# Patient Record
Sex: FEMALE | Race: WHITE | Employment: UNEMPLOYED | URBAN - METROPOLITAN AREA
[De-identification: names, ages, dates, MRNs, and addresses within clinical notes are randomized per-mention and may not be internally consistent; named-entity substitution may affect disease eponyms.]

---

## 2019-06-20 ENCOUNTER — HOSPITAL ENCOUNTER (OUTPATIENT)
Age: 54
Discharge: HOME | End: 2019-06-20
Payer: MEDICARE

## 2019-06-20 ENCOUNTER — APPOINTMENT (OUTPATIENT)
Dept: GENERAL RADIOLOGY | Age: 54
End: 2019-06-20
Payer: MEDICARE

## 2019-06-20 ENCOUNTER — OFFICE VISIT (OUTPATIENT)
Dept: FAMILY MEDICINE CLINIC | Age: 54
End: 2019-06-20
Payer: MEDICARE

## 2019-06-20 ENCOUNTER — HOSPITAL ENCOUNTER (EMERGENCY)
Age: 54
Discharge: HOME | End: 2019-06-20
Attending: EMERGENCY MEDICINE
Payer: MEDICARE

## 2019-06-20 VITALS
HEIGHT: 64 IN | RESPIRATION RATE: 16 BRPM | DIASTOLIC BLOOD PRESSURE: 70 MMHG | HEART RATE: 67 BPM | BODY MASS INDEX: 22.2 KG/M2 | OXYGEN SATURATION: 98 % | TEMPERATURE: 97.8 F | WEIGHT: 130 LBS | SYSTOLIC BLOOD PRESSURE: 117 MMHG

## 2019-06-20 VITALS
OXYGEN SATURATION: 97 % | HEART RATE: 92 BPM | HEIGHT: 64 IN | WEIGHT: 153 LBS | BODY MASS INDEX: 26.12 KG/M2 | DIASTOLIC BLOOD PRESSURE: 100 MMHG | SYSTOLIC BLOOD PRESSURE: 138 MMHG

## 2019-06-20 DIAGNOSIS — Z72.0 TOBACCO ABUSE: ICD-10-CM

## 2019-06-20 DIAGNOSIS — F12.90 MARIJUANA USE: ICD-10-CM

## 2019-06-20 DIAGNOSIS — L73.2 HIDRADENITIS SUPPURATIVA: ICD-10-CM

## 2019-06-20 DIAGNOSIS — E03.9 ACQUIRED HYPOTHYROIDISM: Primary | ICD-10-CM

## 2019-06-20 DIAGNOSIS — S99.921D INJURY OF RIGHT FOOT, SUBSEQUENT ENCOUNTER: ICD-10-CM

## 2019-06-20 DIAGNOSIS — F39 MOOD DISORDER (HCC): ICD-10-CM

## 2019-06-20 DIAGNOSIS — F10.920 ALCOHOLIC INTOXICATION WITHOUT COMPLICATION (HCC): Primary | ICD-10-CM

## 2019-06-20 LAB
A/G RATIO: 1.4 (ref 1.1–2.2)
ACETAMINOPHEN LEVEL: <5 UG/ML (ref 10–30)
ALBUMIN SERPL-MCNC: 5 G/DL (ref 3.4–5)
ALP BLD-CCNC: 56 U/L (ref 40–129)
ALT SERPL-CCNC: 19 U/L (ref 10–40)
AMPHETAMINE SCREEN, URINE: ABNORMAL
ANION GAP SERPL CALCULATED.3IONS-SCNC: 18 MMOL/L (ref 3–16)
AST SERPL-CCNC: 39 U/L (ref 15–37)
BARBITURATE SCREEN URINE: ABNORMAL
BASOPHILS ABSOLUTE: 0.1 K/UL (ref 0–0.2)
BASOPHILS RELATIVE PERCENT: 0.8 %
BENZODIAZEPINE SCREEN, URINE: ABNORMAL
BILIRUB SERPL-MCNC: 0.9 MG/DL (ref 0–1)
BILIRUBIN URINE: NEGATIVE
BLOOD, URINE: ABNORMAL
BUN BLDV-MCNC: 5 MG/DL (ref 7–20)
CALCIUM SERPL-MCNC: 9.6 MG/DL (ref 8.3–10.6)
CANNABINOID SCREEN URINE: POSITIVE
CHLORIDE BLD-SCNC: 97 MMOL/L (ref 99–110)
CLARITY: CLEAR
CO2: 18 MMOL/L (ref 21–32)
COCAINE METABOLITE SCREEN URINE: ABNORMAL
COLOR: YELLOW
CREAT SERPL-MCNC: 0.9 MG/DL (ref 0.6–1.1)
EOSINOPHILS ABSOLUTE: 0.1 K/UL (ref 0–0.6)
EOSINOPHILS RELATIVE PERCENT: 0.9 %
EPITHELIAL CELLS, UA: NORMAL /HPF
ETHANOL: 103 MG/DL (ref 0–0.08)
ETHANOL: 75 MG/DL (ref 0–0.08)
GFR AFRICAN AMERICAN: >60
GFR NON-AFRICAN AMERICAN: >60
GLOBULIN: 3.7 G/DL
GLUCOSE BLD-MCNC: 94 MG/DL (ref 70–99)
GLUCOSE URINE: NEGATIVE MG/DL
HCT VFR BLD CALC: 48.2 % (ref 36–48)
HEMOGLOBIN: 16.5 G/DL (ref 12–16)
KETONES, URINE: NEGATIVE MG/DL
LEUKOCYTE ESTERASE, URINE: NEGATIVE
LITHIUM DOSE AMOUNT: ABNORMAL
LITHIUM LEVEL: <0.1 MMOL/L (ref 0.6–1.2)
LYMPHOCYTES ABSOLUTE: 3.4 K/UL (ref 1–5.1)
LYMPHOCYTES RELATIVE PERCENT: 41.8 %
Lab: ABNORMAL
MCH RBC QN AUTO: 32.3 PG (ref 26–34)
MCHC RBC AUTO-ENTMCNC: 34.3 G/DL (ref 31–36)
MCV RBC AUTO: 94.2 FL (ref 80–100)
METHADONE SCREEN, URINE: ABNORMAL
MICROSCOPIC EXAMINATION: YES
MONOCYTES ABSOLUTE: 0.5 K/UL (ref 0–1.3)
MONOCYTES RELATIVE PERCENT: 5.8 %
NEUTROPHILS ABSOLUTE: 4.1 K/UL (ref 1.7–7.7)
NEUTROPHILS RELATIVE PERCENT: 50.7 %
NITRITE, URINE: NEGATIVE
OPIATE SCREEN URINE: ABNORMAL
OXYCODONE URINE: ABNORMAL
PDW BLD-RTO: 15.1 % (ref 12.4–15.4)
PH UA: 6
PH UA: 6 (ref 5–8)
PHENCYCLIDINE SCREEN URINE: ABNORMAL
PLATELET # BLD: 192 K/UL (ref 135–450)
PMV BLD AUTO: 8.7 FL (ref 5–10.5)
POTASSIUM SERPL-SCNC: 3.5 MMOL/L (ref 3.5–5.1)
PROPOXYPHENE SCREEN: ABNORMAL
PROTEIN UA: NEGATIVE MG/DL
RBC # BLD: 5.11 M/UL (ref 4–5.2)
RBC UA: NORMAL /HPF (ref 0–2)
SALICYLATE, SERUM: <0.3 MG/DL (ref 15–30)
SODIUM BLD-SCNC: 133 MMOL/L (ref 136–145)
SPECIFIC GRAVITY UA: <=1.005 (ref 1–1.03)
T4 FREE: 0.3 NG/DL (ref 0.9–1.8)
TOTAL PROTEIN: 8.7 G/DL (ref 6.4–8.2)
TSH REFLEX: 99.14 UIU/ML (ref 0.27–4.2)
URINE TYPE: ABNORMAL
UROBILINOGEN, URINE: 0.2 E.U./DL
WBC # BLD: 8.1 K/UL (ref 4–11)
WBC UA: NORMAL /HPF (ref 0–5)

## 2019-06-20 PROCEDURE — 84439 ASSAY OF FREE THYROXINE: CPT

## 2019-06-20 PROCEDURE — G0480 DRUG TEST DEF 1-7 CLASSES: HCPCS

## 2019-06-20 PROCEDURE — 73620 X-RAY EXAM OF FOOT: CPT

## 2019-06-20 PROCEDURE — 99285 EMERGENCY DEPT VISIT HI MDM: CPT

## 2019-06-20 PROCEDURE — 80307 DRUG TEST PRSMV CHEM ANLYZR: CPT

## 2019-06-20 PROCEDURE — 85025 COMPLETE CBC W/AUTO DIFF WBC: CPT

## 2019-06-20 PROCEDURE — 4004F PT TOBACCO SCREEN RCVD TLK: CPT | Performed by: NURSE PRACTITIONER

## 2019-06-20 PROCEDURE — G0444 DEPRESSION SCREEN ANNUAL: HCPCS | Performed by: NURSE PRACTITIONER

## 2019-06-20 PROCEDURE — 80053 COMPREHEN METABOLIC PANEL: CPT

## 2019-06-20 PROCEDURE — 3017F COLORECTAL CA SCREEN DOC REV: CPT | Performed by: NURSE PRACTITIONER

## 2019-06-20 PROCEDURE — 80178 ASSAY OF LITHIUM: CPT

## 2019-06-20 PROCEDURE — G8420 CALC BMI NORM PARAMETERS: HCPCS | Performed by: NURSE PRACTITIONER

## 2019-06-20 PROCEDURE — G8427 DOCREV CUR MEDS BY ELIG CLIN: HCPCS | Performed by: NURSE PRACTITIONER

## 2019-06-20 PROCEDURE — 99203 OFFICE O/P NEW LOW 30 MIN: CPT | Performed by: NURSE PRACTITIONER

## 2019-06-20 PROCEDURE — 81001 URINALYSIS AUTO W/SCOPE: CPT

## 2019-06-20 PROCEDURE — 84443 ASSAY THYROID STIM HORMONE: CPT

## 2019-06-20 PROCEDURE — 36415 COLL VENOUS BLD VENIPUNCTURE: CPT

## 2019-06-20 RX ORDER — DOXYCYCLINE HYCLATE 50 MG/1
50 CAPSULE ORAL DAILY
Qty: 30 CAPSULE | Refills: 2 | Status: ON HOLD | OUTPATIENT
Start: 2019-06-20 | End: 2020-06-24 | Stop reason: HOSPADM

## 2019-06-20 RX ORDER — HYDROXYZINE HYDROCHLORIDE 25 MG/1
25 TABLET, FILM COATED ORAL EVERY 8 HOURS PRN
Qty: 90 TABLET | Refills: 0 | Status: SHIPPED | OUTPATIENT
Start: 2019-06-20 | End: 2019-07-17 | Stop reason: SDUPTHER

## 2019-06-20 RX ORDER — QUETIAPINE FUMARATE 100 MG/1
100 TABLET, FILM COATED ORAL NIGHTLY
Qty: 60 TABLET | Refills: 0 | Status: SHIPPED | OUTPATIENT
Start: 2019-06-20 | End: 2019-08-16 | Stop reason: SDUPTHER

## 2019-06-20 RX ORDER — DIVALPROEX SODIUM 125 MG/1
250 CAPSULE, COATED PELLETS ORAL 2 TIMES DAILY
Qty: 120 CAPSULE | Refills: 0 | Status: SHIPPED | OUTPATIENT
Start: 2019-06-20 | End: 2019-07-22 | Stop reason: SDUPTHER

## 2019-06-20 ASSESSMENT — PATIENT HEALTH QUESTIONNAIRE - PHQ9
6. FEELING BAD ABOUT YOURSELF - OR THAT YOU ARE A FAILURE OR HAVE LET YOURSELF OR YOUR FAMILY DOWN: 3
4. FEELING TIRED OR HAVING LITTLE ENERGY: 0
3. TROUBLE FALLING OR STAYING ASLEEP: 3
SUM OF ALL RESPONSES TO PHQ QUESTIONS 1-9: 16
1. LITTLE INTEREST OR PLEASURE IN DOING THINGS: 3
2. FEELING DOWN, DEPRESSED OR HOPELESS: 3
9. THOUGHTS THAT YOU WOULD BE BETTER OFF DEAD, OR OF HURTING YOURSELF: 3
10. IF YOU CHECKED OFF ANY PROBLEMS, HOW DIFFICULT HAVE THESE PROBLEMS MADE IT FOR YOU TO DO YOUR WORK, TAKE CARE OF THINGS AT HOME, OR GET ALONG WITH OTHER PEOPLE: 3
5. POOR APPETITE OR OVEREATING: 0
SUM OF ALL RESPONSES TO PHQ QUESTIONS 1-9: 16
7. TROUBLE CONCENTRATING ON THINGS, SUCH AS READING THE NEWSPAPER OR WATCHING TELEVISION: 0
8. MOVING OR SPEAKING SO SLOWLY THAT OTHER PEOPLE COULD HAVE NOTICED. OR THE OPPOSITE, BEING SO FIGETY OR RESTLESS THAT YOU HAVE BEEN MOVING AROUND A LOT MORE THAN USUAL: 1
SUM OF ALL RESPONSES TO PHQ9 QUESTIONS 1 & 2: 6

## 2019-06-20 ASSESSMENT — ENCOUNTER SYMPTOMS
CHOKING: 0
SHORTNESS OF BREATH: 0
CHEST TIGHTNESS: 0
COUGH: 0
STRIDOR: 0
COLOR CHANGE: 1
WHEEZING: 0
APNEA: 0

## 2019-06-20 NOTE — ED NOTES
MD Khoa Echavarria at bedside speaking with patient at this time.       Mirtha Sandhu RN  06/20/19 7655

## 2019-06-20 NOTE — ED TRIAGE NOTES
Chief Complaint   Patient presents with    Psychiatric Evaluation     Pt brought in by police and placed on a hold for SI. Pt reportedly sent text messages to her  stating she was going to kill herself with a shotgun. Pts  called police. Pt states that she \"drank a little wine\" tonight, but denies drug use. Pt very argumentative with writer and cursing at [facility]. \"Like you fucking care. I guess I am just a [employer] white woman who will get fucked by all of the government mandates. I need to see a . I am a criminal and I have to right to an . \" Pt states \"My  left me for his daddy's jack and now I am just going to kill myself. Fuck it. Just kill me. \"

## 2019-06-20 NOTE — ED NOTES
Discharge instructions reviewed and belongings returned. Pt verbalized her understanding. Pt stated she was cold and asked to wait outside for her . Pt escorted to lobby by LEILA Calderon.       Ana Lilia Sandoval RN  06/20/19 8778

## 2019-06-20 NOTE — ED NOTES
Pt continues to yell at staff. Pt states \"I'm in pain and you don't even fucking care. \" Writer asked patient where she was having pain. Pt states \"on my fucking foot. Right there. \" Pt points to right foot, that is bruised and swollen. Xray order placed. Pt asked to change into safety gown, patient states \"Fine fuck it. \" Pt then takes all of her clothing off and is standing at bedside naked yelling at [facility]! Are you happy now? \" Pt in safety gown at this time and belongings placed at RN station. Pt given warm blankets after yelling at writer that she was \"Fucking cold, but it doesn't matter. I have no rights. Fuck it. Just kill me. \" AliseRN at bedside to draw labs.       Hi Killian RN  06/20/19 9105

## 2019-06-20 NOTE — ED NOTES
Pt arrived ambulatory to [facility] AN [facility]FFIL[facility] OF [facility] with JAQUAN Mckenzie. Pt belongings placed in locker and pt roomed in B3. Pt immediately began crying upon going into the treatment room. Pt then came back out asking when she could leave. JAQUAN banerjee explained to pt that someone would be in to talk with her soon. Pt states, Whitney Pole is soon? You have been telling me that all night and I have been here for 7 hours! \" JAQUAN banerjee explained to pt that it is a long process and that it takes some time. Pt went back into room. No further needs. Will continue to monitor for safety.     ROD Neal
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
## 2019-06-20 NOTE — ED NOTES
Report given to Donnie Garrido in [facility] AN [facility]IL[facility] OF [facility].       Denisa Peguero RN  06/20/19 0298

## 2019-06-20 NOTE — DISCHARGE INSTR - COC
Continuity of Care Form    Patient Name: Eli Jj   :  1965  MRN:  [id number]    Admit date:  2019  Discharge date:  ***    Code Status Order: No Order   Advance Directives:     Admitting Physician:  No admitting provider for patient encounter. PCP: Dian Caraballo MD    Discharging Nurse: [facility] Unit/Room#: B3/B3  Discharging Unit Phone Number: ***    Emergency Contact:   Extended Emergency Contact Information  Primary Emergency Contact: Ashkan Valerio  Address: [address], [place]  Home Phone: [phone]  Work Phone: [phone]  Relation: Spouse  Secondary Emergency Contact: Sebastian Valerio  Home Phone: [phone]  Relation: Parent    Past Surgical History:  Past Surgical History:   Procedure Laterality Date     SECTION      FRACTURE SURGERY      HYSTERECTOMY      LEG SURGERY         Immunization History: There is no immunization history on file for this patient. Active Problems: There is no problem list on file for this patient.       Isolation/Infection:   Isolation          No Isolation            Nurse Assessment:  Last Vital Signs: BP (!) 142/85   Pulse 70   Temp 98.2 °F (36.8 °C) (Oral)   Resp 18   Ht 5' 4\" (1.626 m)   Wt 130 lb (59 kg)   SpO2 100%   BMI 22.31 kg/m²     Last documented pain score (0-10 scale):    Last Weight:   Wt Readings from Last 1 Encounters:   19 130 lb (59 kg)     Mental Status:  {IP PT MENTAL STATUS:67521}    IV Access:  { DEANGELO IV ACCESS:013987871}    Nursing Mobility/ADLs:  Walking   {CHP DME CSP}  Transfer  {CHP DME LWTJ:526435154}  Bathing  {CHP DME UQBT:764451742}  Dressing  {CHP DME WQPE:359125695}  Toileting  {CHP DME KYDS:61964}  Feeding  {CHP DME CBWQ:692835516}  Med Admin  {CHP DME VQWF:914995442}  Med Delivery   { DEANGELO MED Delivery:910563472}    Wound Care Documentation and Therapy:        Elimination:  Continence:   · Bowel: {YES / Treatments After Discharge: ***    Physician Certification: I certify the above information and transfer of Petr Bravo  is necessary for the continuing treatment of the diagnosis listed and that she requires {Admit to Appropriate Level of Care:96384} for {GREATER/LESS:491135191} 30 days.      Update Admission H&P: {CHP DME Changes in PVFA}    PHYSICIAN SIGNATURE:  {Esignature:608361341}
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
## 2019-06-20 NOTE — ED NOTES
Repeat ethanol level and urine sent to lab     Wyatt Domínguez, [address] [place] [address]  06/20/19 2902
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
## 2019-06-20 NOTE — PROGRESS NOTES
2019    Chief Complaint   Patient presents with   Est[facility] Doctor     emotional,boil on LT upper thigh on going,      Marcella Mas (:  1965) is a 46 y.o. female, here for evaluation of the following medical concerns:    New patient here to establish care. She is accompanied by her . She is crying and tearful when I enter the room. States that she has been very emotional.  When asked to further elaborate patient states \" feelings that everybody keeps telling me around. \"  She states that she feels like \"I do not matter anymore. \" She was in the emergency room last evening for threats of hurting herself, patient states that she was \"drunk and I never drink but I needed something to help me. \" She states she would \"never really hurt myself I was just drunk and upset. \" has a history of bipolar disorder, has been off of her medication for the past year. Her previous PCP moved to a different state and patient quit going to that practice because she felt \"no one took me seriously. \" She was on Seroquel, Lithium and Depakote. She states that she will not take lithium anymore because \"I felt like a zombie. \"      History of hidradenitis suppurativa, was previously on doxycycline  50 mg for prevention. States has seen dermatologist \"all my life but diagnosed myself by something that popped up on my computer. \" Currently has boil in the left groin that is increasing in discomfort. Right foot pain: dropped \"something on my foot\", occurred . Had an xray done in the ER that was negative for fracture or dislocation but did not show mild soft tissue swelling. She is not driving due to the pain in the foot. Hypothyroidism: has been off of Synthroid for the past year, reports was on 100 mcg levothyroxine. Review of Systems   Constitutional: Negative for activity change, appetite change, chills, diaphoresis, fatigue, fever and unexpected weight change.    Respiratory: Negative for SURGERY         Social History     Socioeconomic History    Marital status:      Spouse name: Not on file    Number of children: Not on file    Years of education: Not on file    Highest education level: Not on file   Occupational History    Not on file   Social Needs    Financial resource strain: Not on file    Food insecurity:     Worry: Not on file     Inability: Not on file    Transportation needs:     Medical: Not on file     Non-medical: Not on file   Tobacco Use    Smoking status: Current Every Day Smoker     Packs/day: 1.50     Years: 40.00     Pack years: 60.00     Types: Cigarettes    Smokeless tobacco: Never Used    Tobacco comment: handouts   Substance and Sexual Activity    Alcohol use:  Yes     Alcohol/week: 0.6 oz     Types: 1 Glasses of wine per week     Comment: rarely-once every 5 years    Drug use: Yes     Types: Marijuana     Comment: daily    Sexual activity: Yes     Partners: Male   Lifestyle    Physical activity:     Days per week: Not on file     Minutes per session: Not on file    Stress: Not on file   Relationships    Social connections:     Talks on phone: Not on file     Gets together: Not on file     Attends Restoration service: Not on file     Active member of club or organization: Not on file     Attends meetings of clubs or organizations: Not on file     Relationship status: Not on file    Intimate partner violence:     Fear of current or ex partner: Not on file     Emotionally abused: Not on file     Physically abused: Not on file     Forced sexual activity: Not on file   Other Topics Concern    Not on file   Social History Narrative    Not on file        Family History   Problem Relation Age of Onset    Heart Disease Mother     Mental Illness Mother         does not know what she was diagnosed with    Heart Attack Father     Alcohol Abuse Brother     Diabetes Maternal Grandmother      Vitals:    06/20/19 1310   BP: (!) 138/100   Site: Left Upper Arm memory are intact. She does appear somewhat withdrawn. Poor eye contact. Her  is with her and she does seem agitated with him. She is attentive. Nursing note and vitals reviewed. ASSESSMENT/PLAN:  1. Acquired hypothyroidism  - TSH with Reflex; Future    2. Mood disorder (HCC)  - QUEtiapine (SEROQUEL) 100 MG tablet; Take 1 tablet by mouth nightly  Dispense: 60 tablet; Refill: 0  - divalproex (DEPAKOTE SPRINKLES) 125 MG capsule; Take 2 capsules by mouth 2 times daily  Dispense: 120 capsule; Refill: 0  - hydrOXYzine (ATARAX) 25 MG tablet; Take 1 tablet by mouth every 8 hours as needed for Anxiety  Dispense: 90 tablet; Refill: 0  - External Referral to Psychiatry    3. Hidradenitis suppurativa  - doxycycline (VIBRAMYCIN) 50 MG capsule; Take 1 capsule by mouth daily  Dispense: 30 capsule; Refill: 2    4. Injury of right foot, subsequent encounter    5. Marijuana use    6. Tobacco abuse    Resume seroquel and depakote  Start hydroxyzine for anxiety  Referral to psychiatry  Verbal contract with patient today that should any further suicidal thoughts occur she will go to ER. Resume doxycycline  Rest, ice and elevate the right foot  Recommend tobacco and marijuana cessation  Follow up in 4 weeks      No follow-ups on file. An  electronic signature was used to authenticate this note.     --TYLER Fajardo - CNP on 6/21/2019 at 10:02 AM

## 2019-06-21 PROBLEM — L73.2 HIDRADENITIS SUPPURATIVA: Status: ACTIVE | Noted: 2019-06-21

## 2019-06-21 PROBLEM — E03.9 ACQUIRED HYPOTHYROIDISM: Status: ACTIVE | Noted: 2019-06-21

## 2019-06-21 PROBLEM — Z72.0 TOBACCO ABUSE: Status: ACTIVE | Noted: 2019-06-21

## 2019-06-21 PROBLEM — S99.921A INJURY OF RIGHT FOOT: Status: ACTIVE | Noted: 2019-06-21

## 2019-06-21 PROBLEM — F12.90 MARIJUANA USE: Status: ACTIVE | Noted: 2019-06-21

## 2019-06-21 PROBLEM — F39 MOOD DISORDER (HCC): Status: ACTIVE | Noted: 2019-06-21

## 2019-06-21 ASSESSMENT — ENCOUNTER SYMPTOMS: BACK PAIN: 0

## 2019-06-25 RX ORDER — LEVOTHYROXINE SODIUM 0.05 MG/1
50 TABLET ORAL DAILY
Qty: 30 TABLET | Refills: 1 | Status: SHIPPED | OUTPATIENT
Start: 2019-06-25 | End: 2019-09-04 | Stop reason: SDUPTHER

## 2019-07-17 DIAGNOSIS — F39 MOOD DISORDER (HCC): ICD-10-CM

## 2019-07-17 RX ORDER — HYDROXYZINE HYDROCHLORIDE 25 MG/1
25 TABLET, FILM COATED ORAL EVERY 8 HOURS PRN
Qty: 90 TABLET | Refills: 0 | Status: ON HOLD | OUTPATIENT
Start: 2019-07-17 | End: 2020-06-24 | Stop reason: HOSPADM

## 2019-07-22 DIAGNOSIS — F39 MOOD DISORDER (HCC): ICD-10-CM

## 2019-07-23 ENCOUNTER — TELEPHONE (OUTPATIENT)
Dept: FAMILY MEDICINE CLINIC | Age: 54
End: 2019-07-23

## 2019-07-24 RX ORDER — DIVALPROEX SODIUM 125 MG/1
CAPSULE, COATED PELLETS ORAL
Qty: 120 CAPSULE | Refills: 0 | Status: ON HOLD | OUTPATIENT
Start: 2019-07-24 | End: 2020-06-24 | Stop reason: HOSPADM

## 2019-08-16 DIAGNOSIS — F39 MOOD DISORDER (HCC): ICD-10-CM

## 2019-08-16 RX ORDER — QUETIAPINE FUMARATE 100 MG/1
TABLET, FILM COATED ORAL
Qty: 30 TABLET | Refills: 0 | Status: SHIPPED | OUTPATIENT
Start: 2019-08-16 | End: 2019-09-17 | Stop reason: SDUPTHER

## 2019-08-16 NOTE — TELEPHONE ENCOUNTER
We were not able to reach patient last month to tell her to schedule an appointment. A letter was mailed out to her. She no showed her appt on 07/23/19  Last appt - 6/20/2019    No future appointments.

## 2020-06-16 ENCOUNTER — HOSPITAL ENCOUNTER (INPATIENT)
Age: 55
LOS: 7 days | Discharge: HOME | DRG: 885 | End: 2020-06-24
Attending: EMERGENCY MEDICINE | Admitting: PSYCHIATRY & NEUROLOGY
Payer: MEDICARE

## 2020-06-16 ENCOUNTER — APPOINTMENT (OUTPATIENT)
Dept: GENERAL RADIOLOGY | Age: 55
DRG: 885 | End: 2020-06-16
Payer: MEDICARE

## 2020-06-16 LAB
A/G RATIO: 1.3 (ref 1.1–2.2)
ACETAMINOPHEN LEVEL: <5 UG/ML (ref 10–30)
ALBUMIN SERPL-MCNC: 4.5 G/DL (ref 3.4–5)
ALP BLD-CCNC: 47 U/L (ref 40–129)
ALT SERPL-CCNC: 76 U/L (ref 10–40)
AMPHETAMINE SCREEN, URINE: ABNORMAL
ANION GAP SERPL CALCULATED.3IONS-SCNC: 14 MMOL/L (ref 3–16)
AST SERPL-CCNC: 69 U/L (ref 15–37)
BARBITURATE SCREEN URINE: ABNORMAL
BASOPHILS ABSOLUTE: 0.1 K/UL (ref 0–0.2)
BASOPHILS RELATIVE PERCENT: 1.2 %
BENZODIAZEPINE SCREEN, URINE: ABNORMAL
BILIRUB SERPL-MCNC: 0.5 MG/DL (ref 0–1)
BUN BLDV-MCNC: 8 MG/DL (ref 7–20)
CALCIUM SERPL-MCNC: 9.5 MG/DL (ref 8.3–10.6)
CANNABINOID SCREEN URINE: POSITIVE
CHLORIDE BLD-SCNC: 99 MMOL/L (ref 99–110)
CO2: 20 MMOL/L (ref 21–32)
COCAINE METABOLITE SCREEN URINE: ABNORMAL
CREAT SERPL-MCNC: 0.8 MG/DL (ref 0.6–1.1)
EOSINOPHILS ABSOLUTE: 0 K/UL (ref 0–0.6)
EOSINOPHILS RELATIVE PERCENT: 0.1 %
ETHANOL: NORMAL MG/DL (ref 0–0.08)
GFR AFRICAN AMERICAN: >60
GFR NON-AFRICAN AMERICAN: >60
GLOBULIN: 3.6 G/DL
GLUCOSE BLD-MCNC: 115 MG/DL (ref 70–99)
HCG QUALITATIVE: NEGATIVE
HCT VFR BLD CALC: 43.3 % (ref 36–48)
HEMOGLOBIN: 14.6 G/DL (ref 12–16)
LIPASE: 24 U/L (ref 13–60)
LITHIUM DOSE AMOUNT: ABNORMAL
LITHIUM LEVEL: <0.1 MMOL/L (ref 0.6–1.2)
LYMPHOCYTES ABSOLUTE: 1.6 K/UL (ref 1–5.1)
LYMPHOCYTES RELATIVE PERCENT: 21.4 %
Lab: ABNORMAL
MCH RBC QN AUTO: 31.7 PG (ref 26–34)
MCHC RBC AUTO-ENTMCNC: 33.7 G/DL (ref 31–36)
MCV RBC AUTO: 94.1 FL (ref 80–100)
METHADONE SCREEN, URINE: ABNORMAL
MONOCYTES ABSOLUTE: 0.6 K/UL (ref 0–1.3)
MONOCYTES RELATIVE PERCENT: 7.6 %
NEUTROPHILS ABSOLUTE: 5.4 K/UL (ref 1.7–7.7)
NEUTROPHILS RELATIVE PERCENT: 69.7 %
OPIATE SCREEN URINE: ABNORMAL
OXYCODONE URINE: ABNORMAL
PDW BLD-RTO: 13.4 % (ref 12.4–15.4)
PH UA: 6
PHENCYCLIDINE SCREEN URINE: ABNORMAL
PLATELET # BLD: 177 K/UL (ref 135–450)
PMV BLD AUTO: 9 FL (ref 5–10.5)
POTASSIUM REFLEX MAGNESIUM: 3.9 MMOL/L (ref 3.5–5.1)
PROPOXYPHENE SCREEN: ABNORMAL
RBC # BLD: 4.6 M/UL (ref 4–5.2)
SALICYLATE, SERUM: 3.8 MG/DL (ref 15–30)
SODIUM BLD-SCNC: 133 MMOL/L (ref 136–145)
TOTAL PROTEIN: 8.1 G/DL (ref 6.4–8.2)
WBC # BLD: 7.7 K/UL (ref 4–11)

## 2020-06-16 PROCEDURE — 73110 X-RAY EXAM OF WRIST: CPT

## 2020-06-16 PROCEDURE — G0480 DRUG TEST DEF 1-7 CLASSES: HCPCS

## 2020-06-16 PROCEDURE — 99285 EMERGENCY DEPT VISIT HI MDM: CPT

## 2020-06-16 PROCEDURE — 84439 ASSAY OF FREE THYROXINE: CPT

## 2020-06-16 PROCEDURE — 71045 X-RAY EXAM CHEST 1 VIEW: CPT

## 2020-06-16 PROCEDURE — 81001 URINALYSIS AUTO W/SCOPE: CPT

## 2020-06-16 PROCEDURE — 84703 CHORIONIC GONADOTROPIN ASSAY: CPT

## 2020-06-16 PROCEDURE — 84443 ASSAY THYROID STIM HORMONE: CPT

## 2020-06-16 PROCEDURE — 36415 COLL VENOUS BLD VENIPUNCTURE: CPT

## 2020-06-16 PROCEDURE — 80307 DRUG TEST PRSMV CHEM ANLYZR: CPT

## 2020-06-16 PROCEDURE — 80053 COMPREHEN METABOLIC PANEL: CPT

## 2020-06-16 PROCEDURE — 80178 ASSAY OF LITHIUM: CPT

## 2020-06-16 PROCEDURE — 85025 COMPLETE CBC W/AUTO DIFF WBC: CPT

## 2020-06-16 PROCEDURE — 83690 ASSAY OF LIPASE: CPT

## 2020-06-16 PROCEDURE — 73562 X-RAY EXAM OF KNEE 3: CPT

## 2020-06-16 PROCEDURE — 73100 X-RAY EXAM OF WRIST: CPT

## 2020-06-16 RX ORDER — IBUPROFEN 800 MG/1
800 TABLET ORAL ONCE
Status: COMPLETED | OUTPATIENT
Start: 2020-06-17 | End: 2020-06-17

## 2020-06-16 ASSESSMENT — ENCOUNTER SYMPTOMS
VOMITING: 0
CONSTIPATION: 0
NAUSEA: 0
ABDOMINAL PAIN: 0
SORE THROAT: 0
SHORTNESS OF BREATH: 0
COUGH: 0
DIARRHEA: 0

## 2020-06-16 ASSESSMENT — PAIN DESCRIPTION - LOCATION: LOCATION: WRIST

## 2020-06-16 ASSESSMENT — PAIN DESCRIPTION - ORIENTATION: ORIENTATION: LEFT

## 2020-06-16 ASSESSMENT — PAIN SCALES - GENERAL: PAINLEVEL_OUTOF10: 9

## 2020-06-17 PROBLEM — F12.10 CANNABIS USE DISORDER, MILD, ABUSE: Status: ACTIVE | Noted: 2020-06-17

## 2020-06-17 PROBLEM — F31.2 BIPOLAR I DISORDER, CURRENT OR MOST RECENT EPISODE MANIC, WITH PSYCHOTIC FEATURES (HCC): Status: ACTIVE | Noted: 2020-06-17

## 2020-06-17 PROBLEM — F31.9 BIPOLAR 1 DISORDER (HCC): Status: ACTIVE | Noted: 2020-06-17

## 2020-06-17 LAB
BACTERIA: ABNORMAL /HPF
BILIRUBIN URINE: NEGATIVE
BLOOD, URINE: NEGATIVE
CLARITY: CLEAR
COLOR: YELLOW
EPITHELIAL CELLS, UA: ABNORMAL /HPF (ref 0–5)
GLUCOSE URINE: NEGATIVE MG/DL
HYALINE CASTS: ABNORMAL /LPF (ref 0–2)
KETONES, URINE: NEGATIVE MG/DL
LEUKOCYTE ESTERASE, URINE: ABNORMAL
MICROSCOPIC EXAMINATION: YES
MUCUS: ABNORMAL /LPF
NITRITE, URINE: NEGATIVE
PH UA: 6 (ref 5–8)
PROTEIN UA: NEGATIVE MG/DL
RBC UA: ABNORMAL /HPF (ref 0–4)
SARS-COV-2, NAAT: NOT DETECTED
SPECIFIC GRAVITY UA: 1.01 (ref 1–1.03)
T4 FREE: 1.1 NG/DL (ref 0.9–1.8)
TSH SERPL DL<=0.05 MIU/L-ACNC: 12.69 UIU/ML (ref 0.27–4.2)
URINE REFLEX TO CULTURE: ABNORMAL
URINE TYPE: ABNORMAL
UROBILINOGEN, URINE: 0.2 E.U./DL
WBC UA: ABNORMAL /HPF (ref 0–5)

## 2020-06-17 PROCEDURE — 6370000000 HC RX 637 (ALT 250 FOR IP): Performed by: EMERGENCY MEDICINE

## 2020-06-17 PROCEDURE — 99222 1ST HOSP IP/OBS MODERATE 55: CPT | Performed by: PHYSICIAN ASSISTANT

## 2020-06-17 PROCEDURE — 1240000000 HC EMOTIONAL WELLNESS R&B

## 2020-06-17 PROCEDURE — 6370000000 HC RX 637 (ALT 250 FOR IP): Performed by: PSYCHIATRY & NEUROLOGY

## 2020-06-17 PROCEDURE — 99223 1ST HOSP IP/OBS HIGH 75: CPT | Performed by: PSYCHIATRY & NEUROLOGY

## 2020-06-17 PROCEDURE — U0002 COVID-19 LAB TEST NON-CDC: HCPCS

## 2020-06-17 RX ORDER — IBUPROFEN 400 MG/1
400 TABLET ORAL EVERY 6 HOURS PRN
Status: DISCONTINUED | OUTPATIENT
Start: 2020-06-17 | End: 2020-06-17

## 2020-06-17 RX ORDER — POLYETHYLENE GLYCOL 3350 17 G
2 POWDER IN PACKET (EA) ORAL
Status: DISCONTINUED | OUTPATIENT
Start: 2020-06-17 | End: 2020-06-24 | Stop reason: HOSPADM

## 2020-06-17 RX ORDER — OLANZAPINE 10 MG/1
10 INJECTION, POWDER, LYOPHILIZED, FOR SOLUTION INTRAMUSCULAR
Status: DISCONTINUED | OUTPATIENT
Start: 2020-06-17 | End: 2020-06-17

## 2020-06-17 RX ORDER — MAGNESIUM HYDROXIDE/ALUMINUM HYDROXICE/SIMETHICONE 120; 1200; 1200 MG/30ML; MG/30ML; MG/30ML
30 SUSPENSION ORAL EVERY 6 HOURS PRN
Status: DISCONTINUED | OUTPATIENT
Start: 2020-06-17 | End: 2020-06-24 | Stop reason: HOSPADM

## 2020-06-17 RX ORDER — BENZTROPINE MESYLATE 1 MG/ML
2 INJECTION INTRAMUSCULAR; INTRAVENOUS 2 TIMES DAILY PRN
Status: DISCONTINUED | OUTPATIENT
Start: 2020-06-17 | End: 2020-06-24 | Stop reason: HOSPADM

## 2020-06-17 RX ORDER — LITHIUM CARBONATE 300 MG/1
300 TABLET, FILM COATED, EXTENDED RELEASE ORAL EVERY 12 HOURS SCHEDULED
Status: DISCONTINUED | OUTPATIENT
Start: 2020-06-17 | End: 2020-06-23

## 2020-06-17 RX ORDER — ACETAMINOPHEN 325 MG/1
650 TABLET ORAL EVERY 4 HOURS PRN
Status: DISCONTINUED | OUTPATIENT
Start: 2020-06-17 | End: 2020-06-24 | Stop reason: HOSPADM

## 2020-06-17 RX ORDER — LEVOTHYROXINE SODIUM 0.05 MG/1
50 TABLET ORAL DAILY
Status: DISCONTINUED | OUTPATIENT
Start: 2020-06-17 | End: 2020-06-24 | Stop reason: HOSPADM

## 2020-06-17 RX ORDER — DIAZEPAM 5 MG/1
5 TABLET ORAL EVERY 6 HOURS PRN
Status: DISCONTINUED | OUTPATIENT
Start: 2020-06-17 | End: 2020-06-24 | Stop reason: HOSPADM

## 2020-06-17 RX ORDER — OLANZAPINE 10 MG/1
10 TABLET ORAL
Status: DISCONTINUED | OUTPATIENT
Start: 2020-06-17 | End: 2020-06-17

## 2020-06-17 RX ORDER — QUETIAPINE 200 MG/1
200 TABLET, FILM COATED, EXTENDED RELEASE ORAL NIGHTLY
Status: DISCONTINUED | OUTPATIENT
Start: 2020-06-17 | End: 2020-06-24

## 2020-06-17 RX ORDER — IBUPROFEN 800 MG/1
800 TABLET ORAL EVERY 8 HOURS PRN
Status: DISCONTINUED | OUTPATIENT
Start: 2020-06-17 | End: 2020-06-20

## 2020-06-17 RX ORDER — OLANZAPINE 10 MG/1
10 TABLET ORAL NIGHTLY
Status: DISCONTINUED | OUTPATIENT
Start: 2020-06-17 | End: 2020-06-17

## 2020-06-17 RX ORDER — OLANZAPINE 10 MG/1
10 INJECTION, POWDER, LYOPHILIZED, FOR SOLUTION INTRAMUSCULAR 2 TIMES DAILY PRN
Status: DISCONTINUED | OUTPATIENT
Start: 2020-06-17 | End: 2020-06-24 | Stop reason: HOSPADM

## 2020-06-17 RX ORDER — DIAZEPAM 5 MG/1
5 TABLET ORAL ONCE
Status: COMPLETED | OUTPATIENT
Start: 2020-06-17 | End: 2020-06-17

## 2020-06-17 RX ORDER — ACETAMINOPHEN 325 MG/1
650 TABLET ORAL EVERY 4 HOURS PRN
Status: DISCONTINUED | OUTPATIENT
Start: 2020-06-17 | End: 2020-06-17

## 2020-06-17 RX ORDER — NICOTINE 21 MG/24HR
1 PATCH, TRANSDERMAL 24 HOURS TRANSDERMAL DAILY
Status: DISCONTINUED | OUTPATIENT
Start: 2020-06-17 | End: 2020-06-24

## 2020-06-17 RX ORDER — QUETIAPINE FUMARATE 25 MG/1
25 TABLET, FILM COATED ORAL ONCE
Status: COMPLETED | OUTPATIENT
Start: 2020-06-17 | End: 2020-06-17

## 2020-06-17 RX ORDER — TRAZODONE HYDROCHLORIDE 50 MG/1
50 TABLET ORAL NIGHTLY PRN
Status: DISCONTINUED | OUTPATIENT
Start: 2020-06-17 | End: 2020-06-17

## 2020-06-17 RX ORDER — QUETIAPINE FUMARATE 25 MG/1
50 TABLET, FILM COATED ORAL EVERY 4 HOURS PRN
Status: DISCONTINUED | OUTPATIENT
Start: 2020-06-17 | End: 2020-06-24 | Stop reason: HOSPADM

## 2020-06-17 RX ADMIN — ACETAMINOPHEN 650 MG: 325 TABLET ORAL at 20:17

## 2020-06-17 RX ADMIN — LITHIUM CARBONATE 300 MG: 300 TABLET, FILM COATED, EXTENDED RELEASE ORAL at 10:24

## 2020-06-17 RX ADMIN — DIAZEPAM 5 MG: 5 TABLET ORAL at 20:21

## 2020-06-17 RX ADMIN — IBUPROFEN 800 MG: 800 TABLET, FILM COATED ORAL at 14:48

## 2020-06-17 RX ADMIN — QUETIAPINE FUMARATE 25 MG: 25 TABLET ORAL at 01:48

## 2020-06-17 RX ADMIN — OLANZAPINE 10 MG: 10 TABLET, FILM COATED ORAL at 00:47

## 2020-06-17 RX ADMIN — LITHIUM CARBONATE 300 MG: 300 TABLET, FILM COATED, EXTENDED RELEASE ORAL at 20:17

## 2020-06-17 RX ADMIN — LEVOTHYROXINE SODIUM 50 MCG: 50 TABLET ORAL at 10:25

## 2020-06-17 RX ADMIN — QUETIAPINE FUMARATE 200 MG: 200 TABLET, EXTENDED RELEASE ORAL at 20:17

## 2020-06-17 RX ADMIN — DIAZEPAM 5 MG: 5 TABLET ORAL at 10:25

## 2020-06-17 RX ADMIN — QUETIAPINE FUMARATE 50 MG: 25 TABLET ORAL at 14:40

## 2020-06-17 RX ADMIN — IBUPROFEN 800 MG: 800 TABLET, FILM COATED ORAL at 01:48

## 2020-06-17 ASSESSMENT — PAIN SCALES - GENERAL
PAINLEVEL_OUTOF10: 0
PAINLEVEL_OUTOF10: 0
PAINLEVEL_OUTOF10: 9
PAINLEVEL_OUTOF10: 0
PAINLEVEL_OUTOF10: 9
PAINLEVEL_OUTOF10: 0
PAINLEVEL_OUTOF10: 0
PAINLEVEL_OUTOF10: 9
PAINLEVEL_OUTOF10: 9
PAINLEVEL_OUTOF10: 0
PAINLEVEL_OUTOF10: 0
PAINLEVEL_OUTOF10: 9
PAINLEVEL_OUTOF10: 6

## 2020-06-17 ASSESSMENT — SLEEP AND FATIGUE QUESTIONNAIRES
DO YOU USE A SLEEP AID: NO
DO YOU HAVE DIFFICULTY SLEEPING: NO
AVERAGE NUMBER OF SLEEP HOURS: 6

## 2020-06-17 ASSESSMENT — PAIN DESCRIPTION - LOCATION
LOCATION: HEAD
LOCATION: HEAD

## 2020-06-17 ASSESSMENT — PAIN DESCRIPTION - PROGRESSION
CLINICAL_PROGRESSION: NOT CHANGED

## 2020-06-17 ASSESSMENT — PAIN DESCRIPTION - DESCRIPTORS
DESCRIPTORS: ACHING;HEADACHE
DESCRIPTORS: HEADACHE

## 2020-06-17 ASSESSMENT — PAIN DESCRIPTION - PAIN TYPE
TYPE: ACUTE PAIN
TYPE: ACUTE PAIN

## 2020-06-17 ASSESSMENT — PAIN - FUNCTIONAL ASSESSMENT
PAIN_FUNCTIONAL_ASSESSMENT: ACTIVITIES ARE NOT PREVENTED
PAIN_FUNCTIONAL_ASSESSMENT: ACTIVITIES ARE NOT PREVENTED

## 2020-06-17 ASSESSMENT — PAIN DESCRIPTION - FREQUENCY
FREQUENCY: INTERMITTENT
FREQUENCY: INTERMITTENT

## 2020-06-17 ASSESSMENT — PAIN DESCRIPTION - ONSET
ONSET: GRADUAL
ONSET: GRADUAL

## 2020-06-17 ASSESSMENT — LIFESTYLE VARIABLES
HISTORY_ALCOHOL_USE: NO
HISTORY_ALCOHOL_USE: NO

## 2020-06-17 ASSESSMENT — PAIN DESCRIPTION - ORIENTATION
ORIENTATION: MID
ORIENTATION: MID

## 2020-06-17 NOTE — ED NOTES
Resting peacefully in treatment room. No outward signs of distress. Will continue to monitor for pt safety.

## 2020-06-17 NOTE — ED PROVIDER NOTES
Gastrointestinal: Negative for abdominal pain, constipation, diarrhea, nausea and vomiting. Genitourinary: Negative for dysuria. Musculoskeletal: Positive for arthralgias. Skin: Negative for pallor and rash. Neurological: Negative for light-headedness and headaches. All other systems reviewed and are negative. Except as noted above the remainder of the review of systems was reviewed and negative.        PAST MEDICAL HISTORY     Past Medical History:   Diagnosis Date    B[facility] ([facility]     Boils     Seizures ([facility]     Thyroid disease          SURGICAL HISTORY       Past Surgical History:   Procedure Laterality Date     SECTION      FRACTURE SURGERY      HYSTERECTOMY      LEG SURGERY           CURRENT MEDICATIONS       Previous Medications    DIVALPROEX (DEPAKOTE SPRINKLE) 125 MG CAPSULE    TAKE 2 CAPSULES BY MOUTH TWICE A DAY    DOXYCYCLINE (VIBRAMYCIN) 50 MG CAPSULE    Take 1 capsule by mouth daily    HYDROXYZINE (ATARAX) 25 MG TABLET    TAKE 1 TABLET BY MOUTH EVERY 8 HOURS AS NEEDED FOR ANXIETY    LEVOTHYROXINE (SYNTHROID) 50 MCG TABLET    TAKE 1 TABLET BY MOUTH EVERY DAY    QUETIAPINE (SEROQUEL) 100 MG TABLET    TAKE 1 TABLET BY MOUTH EVERY DAY EVERY NIGHT       ALLERGIES     Clonazepam; Haldol [haloperidol lactate]; and Thorazine [chlorpromazine]    FAMILY HISTORY       Family History   Problem Relation Age of Onset    Heart Disease Mother     Mental Illness Mother         does not know what she was diagnosed with    Heart Attack Father     Alcohol Abuse Brother     Diabetes Maternal Grandmother           SOCIAL HISTORY       Social History     Socioeconomic History    Marital status:      Spouse name: Not on file    Number of children: Not on file    Years of education: Not on file    Highest education level: Not on file   Occupational History    Not on file   Social Needs    Financial resource strain: Not on file    Food insecurity     Worry: Not on [address] [facility],  [place], [place]   Phone [phone]   SALICYLATE LEVEL - Abnormal; Notable for the following components:    Salicylate, Serum 3.8 (*)     All other components within normal limits    Narrative:     Performed at:  [facility] [address],  [place], [place]   Phone [phone]   LITHIUM LEVEL - Abnormal; Notable for the following components:    Lithium Lvl <0.1 (*)     All other components within normal limits    Narrative:     Performed at:  [facility] [address],  [place], [place]   Phone [phone]   CBC WITH AUTO DIFFERENTIAL    Narrative:     Performed at:  [facility] [address],  [place]   Phone [phone]   ETHANOL    Narrative:     Performed at:  [facility] [address],  [place]   Phone [phone]   HCG, SERUM, QUALITATIVE    Narrative:     Performed at:  [facility] [address],  [place], [place]   Phone [phone]   LIPASE    Narrative:     Performed at:  [facility] [address],  [place], [place]   Phone [phone]   URINE RT REFLEX TO CULTURE    Narrative:     Performed at:  [facility] [address],  [place], [place]   Phone [phone]   URINE DRUG SCREEN    Narrative:     Performed at:  [facility]) [facility] [address],  [place], [place]   Phone [phone]       All other labs were within normal range or not returned as of this dictation.     EMERGENCY DEPARTMENT COURSE and DIFFERENTIAL DIAGNOSIS/MDM:   Vitals:    Vitals:    06/16/20 2205 06/16/20 2210 06/16/20 2254 06/16/20 2315   BP: 131/85  122/87    Pulse: 89 109 84 82   Resp: 23 27 12 22   Temp: 97.1 °F (36.2 °C)      TempSrc: Oral      SpO2: 96% 97% 97%

## 2020-06-17 NOTE — ED NOTES
Pt resting in room - no signs or symptoms of distress noted - will continue to monitor     Marylu Vora RN  06/17/20 6541
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
## 2020-06-17 NOTE — ED NOTES
Bed: 01  Expected date:   Expected time:   Means of arrival:   Comments:  gabby Evans, [facility]  06/16/20 6869

## 2020-06-17 NOTE — ED NOTES
Pt states she \"fell out of the bed\", pt did not fall from bed, RN was bedside and pt was attempting to climb out of bed.  Pt is stating that her knee is hurting, MD made aware      Mignon Gonzales RN  06/16/20 0057

## 2020-06-17 NOTE — FLOWSHEET NOTE
06/17/20 1456   Activities of Daily Living   Patient Requires assistance with daily self-care activities?    (MARIBETH as pt was recently medicated and unable to engage in assessment at this time )   Leisure Activity 1   [address] as pt was recently medicated and unable to engage in assessment at this time    Frequency   (MARIBETH)   Last time   (MARIBETH)   Barriers to participating    (MARIBETH)   Leisure Activity 2   [address] as pt was recently medicated and unable to engage in assessment at this time    Frequency    (MARIBETH)   Last time    (MARIBETH)   Barriers to participating    (MARIBETH)   Leisure Activity 3   Favorite Leisure Activities  MARIBETH as pt was recently medicated and unable to engage in assessment at this time    Frequency    (MARIBETH)   Last time    (MARIBETH)   Barriers to participating    (MARIBETH)   Social   Patient reports spending the majority of their free time   (MARIBETH)   Patient verbalizes a preference for spending free time   (MARIBETH)   Patients perception of support system   (MARIBETH)   Patients perception of barriers to socializing with others include(s)   (MARIBETH)   Social Details MARIBETH as pt was recently medicated and unable to engage in assessment at this time    Beliefs & Coping   Has difficulty dealing with feelings     (MARIBETH)   Internalizes feelings/Keeps feelings in   (MARIBETH)   Externalizes feelings through aggressiveness or poor temper control    (MARIBETH)   Feels uncomfortable around others    (MARIBETH)   Has difficulty talking to others    ([address])   Depends on others for direction or decisions   (MARIBETH)   Difficulty dealing with anger of others    (MARIBETH)   Difficulty dealing with own anger    (MARIBETH)   Difficulty managing stress   (MARIBETH)   Frequently has difficulty with relationships    (MARIBETH)   Has recently perceived/experienced loss, disappointment, humiliation or failure    (MARIBETH)   General perception about self   (MARIBETH)   Attitude about abilities   (MARIBETH)   Locus of Control    (MARIBETH)   Belief about
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
## 2020-06-17 NOTE — PLAN OF CARE
Pt has been irritable and angry and verbally aggressive to staff. She was medication compliant but demanding. Sitter D/c'd per physician due to patient being safe. Pt denies SI/HI/AVH. She denies incident preceding hospitalization. She was placed on phone restrictions due to multiple calls threatening her family. Will continue to monitor.   Seamus BOYD, RN-BC

## 2020-06-18 LAB
CHOLESTEROL, TOTAL: 205 MG/DL (ref 0–199)
ESTIMATED AVERAGE GLUCOSE: 105.4 MG/DL
HBA1C MFR BLD: 5.3 %
HDLC SERPL-MCNC: 53 MG/DL (ref 40–60)
LDL CHOLESTEROL CALCULATED: 135 MG/DL
TRIGL SERPL-MCNC: 86 MG/DL (ref 0–150)
VLDLC SERPL CALC-MCNC: 17 MG/DL

## 2020-06-18 PROCEDURE — 36415 COLL VENOUS BLD VENIPUNCTURE: CPT

## 2020-06-18 PROCEDURE — 99233 SBSQ HOSP IP/OBS HIGH 50: CPT | Performed by: PSYCHIATRY & NEUROLOGY

## 2020-06-18 PROCEDURE — 80061 LIPID PANEL: CPT

## 2020-06-18 PROCEDURE — 83036 HEMOGLOBIN GLYCOSYLATED A1C: CPT

## 2020-06-18 PROCEDURE — 6370000000 HC RX 637 (ALT 250 FOR IP): Performed by: PSYCHIATRY & NEUROLOGY

## 2020-06-18 PROCEDURE — 1240000000 HC EMOTIONAL WELLNESS R&B

## 2020-06-18 RX ORDER — HYDROCODONE BITARTRATE AND ACETAMINOPHEN 5; 325 MG/1; MG/1
1 TABLET ORAL EVERY 6 HOURS PRN
Status: DISPENSED | OUTPATIENT
Start: 2020-06-18 | End: 2020-06-19

## 2020-06-18 RX ADMIN — HYDROCODONE BITARTRATE AND ACETAMINOPHEN 1 TABLET: 5; 325 TABLET ORAL at 10:16

## 2020-06-18 RX ADMIN — QUETIAPINE FUMARATE 200 MG: 200 TABLET, EXTENDED RELEASE ORAL at 20:16

## 2020-06-18 RX ADMIN — DIAZEPAM 5 MG: 5 TABLET ORAL at 23:09

## 2020-06-18 RX ADMIN — LITHIUM CARBONATE 300 MG: 300 TABLET, FILM COATED, EXTENDED RELEASE ORAL at 20:16

## 2020-06-18 RX ADMIN — QUETIAPINE FUMARATE 50 MG: 25 TABLET ORAL at 23:09

## 2020-06-18 RX ADMIN — DIAZEPAM 5 MG: 5 TABLET ORAL at 10:49

## 2020-06-18 RX ADMIN — HYDROCODONE BITARTRATE AND ACETAMINOPHEN 1 TABLET: 5; 325 TABLET ORAL at 23:09

## 2020-06-18 RX ADMIN — LITHIUM CARBONATE 300 MG: 300 TABLET, FILM COATED, EXTENDED RELEASE ORAL at 09:16

## 2020-06-18 RX ADMIN — IBUPROFEN 800 MG: 800 TABLET, FILM COATED ORAL at 20:16

## 2020-06-18 RX ADMIN — DIAZEPAM 5 MG: 5 TABLET ORAL at 16:58

## 2020-06-18 RX ADMIN — HYDROCODONE BITARTRATE AND ACETAMINOPHEN 1 TABLET: 5; 325 TABLET ORAL at 16:55

## 2020-06-18 RX ADMIN — LEVOTHYROXINE SODIUM 50 MCG: 50 TABLET ORAL at 06:58

## 2020-06-18 ASSESSMENT — PAIN DESCRIPTION - LOCATION
LOCATION: BACK

## 2020-06-18 ASSESSMENT — PAIN DESCRIPTION - FREQUENCY
FREQUENCY: CONTINUOUS
FREQUENCY: CONTINUOUS

## 2020-06-18 ASSESSMENT — PAIN SCALES - GENERAL
PAINLEVEL_OUTOF10: 6
PAINLEVEL_OUTOF10: 8
PAINLEVEL_OUTOF10: 10
PAINLEVEL_OUTOF10: 10
PAINLEVEL_OUTOF10: 4
PAINLEVEL_OUTOF10: 0
PAINLEVEL_OUTOF10: 10
PAINLEVEL_OUTOF10: 10
PAINLEVEL_OUTOF10: 5
PAINLEVEL_OUTOF10: 8
PAINLEVEL_OUTOF10: 0
PAINLEVEL_OUTOF10: 8

## 2020-06-18 ASSESSMENT — PAIN DESCRIPTION - DESCRIPTORS
DESCRIPTORS: ACHING
DESCRIPTORS: ACHING

## 2020-06-18 ASSESSMENT — PAIN DESCRIPTION - PAIN TYPE
TYPE: ACUTE PAIN

## 2020-06-18 ASSESSMENT — PAIN - FUNCTIONAL ASSESSMENT: PAIN_FUNCTIONAL_ASSESSMENT: PREVENTS OR INTERFERES SOME ACTIVE ACTIVITIES AND ADLS

## 2020-06-18 NOTE — BH NOTE
Spoke to patient's daughter who says her animals are being taken care of. Patient still irritable, yelling and screaming at nurse. Patient says everything is being taken away from her, even her freedom.

## 2020-06-18 NOTE — BH NOTE
Patient cussing in room, repeatedly pushing the call light requesting valium and medication for a headache. Adminstered valium and tylenol. Patient requesting light to be turned off. Lights are automatic and system has been down for awhile. Offered patient a cool wash cloth and offered an ice pack. Patient not pleased with any of these options and called this nurse a stupid bitch as I was walking out the door. Will continue to monitor.

## 2020-06-18 NOTE — PROGRESS NOTES
requires  inpatient stabilization. Principal Problem:    Bipolar 1 disorder (HCC)  Active Problems:    Acquired hypothyroidism    Cannabis use disorder, mild, abuse    Dehydration    Arthralgia  Resolved Problems:    * No resolved hospital problems. *     PLAN:  1. Inpatient stabilization for stabilization and treatment. 2.  On admission, started lithium 300 mg twice daily. Discussed at length with the  patient. She understands that her hypothyroidism is most likely related to  lithium, but believes it has been helpful and can tolerate being on  Synthroid. Also start Seroquel 200 mg p.o. q.h.s. The patient tells me   she was on 450 mg nightly. Ordered every 15-minute checks  for safety, programming, and p.r.n. medication for anxiety, agitation,  and insomnia. 3.  Internal Medicine consult for admission. Elevated LFTs   - mild  - no acute symptoms   - Can follow up OP     Dehydration  - Encourage PO intake      Hypothyroidism  - suspect non-compliance with medications (reports she hasnt had meds for ~1 year)  - Resume home synthroid dosing   - TSH >12, Free T4 1.1  - Will need OP follow up and repeat labs with PCP      Arthralgias   - reports she was tackled by the police   - Has bruising to bilateral wrists, right knee  - complains of low back pain  - Refuses exam of extremities/back  - Offered xrays of lower back, patient refuses   - continue PRN pain control, add lidoderm patch     4. Collateral information for care coordination. 5.  The patient will be admitted on a Statement of Belief, placed on a  hold. Hold expires Monday. Estimated length of stay, 5 to 8 days for stabilization. Total time with patient was 35 minutes and more than 50 % of that time was spent counseling the patient on their symptoms, treatment, and expected goals.      Ammy Stone MD, [facility] [place],Third Floor, [place]

## 2020-06-19 ENCOUNTER — APPOINTMENT (OUTPATIENT)
Dept: GENERAL RADIOLOGY | Age: 55
DRG: 885 | End: 2020-06-19
Payer: MEDICARE

## 2020-06-19 PROCEDURE — 6370000000 HC RX 637 (ALT 250 FOR IP): Performed by: NURSE PRACTITIONER

## 2020-06-19 PROCEDURE — 1240000000 HC EMOTIONAL WELLNESS R&B

## 2020-06-19 PROCEDURE — 99233 SBSQ HOSP IP/OBS HIGH 50: CPT | Performed by: PSYCHIATRY & NEUROLOGY

## 2020-06-19 PROCEDURE — 72100 X-RAY EXAM L-S SPINE 2/3 VWS: CPT

## 2020-06-19 PROCEDURE — 6370000000 HC RX 637 (ALT 250 FOR IP): Performed by: PSYCHIATRY & NEUROLOGY

## 2020-06-19 RX ORDER — DIAZEPAM 5 MG/1
5 TABLET ORAL ONCE
Status: COMPLETED | OUTPATIENT
Start: 2020-06-19 | End: 2020-06-19

## 2020-06-19 RX ADMIN — HYDROCODONE BITARTRATE AND ACETAMINOPHEN 1 TABLET: 5; 325 TABLET ORAL at 09:08

## 2020-06-19 RX ADMIN — LEVOTHYROXINE SODIUM 50 MCG: 50 TABLET ORAL at 07:27

## 2020-06-19 RX ADMIN — DIAZEPAM 5 MG: 5 TABLET ORAL at 09:08

## 2020-06-19 RX ADMIN — DIAZEPAM 5 MG: 5 TABLET ORAL at 10:51

## 2020-06-19 RX ADMIN — ACETAMINOPHEN 650 MG: 325 TABLET ORAL at 21:37

## 2020-06-19 RX ADMIN — DIAZEPAM 5 MG: 5 TABLET ORAL at 15:12

## 2020-06-19 RX ADMIN — DIAZEPAM 5 MG: 5 TABLET ORAL at 21:39

## 2020-06-19 RX ADMIN — IBUPROFEN 800 MG: 800 TABLET, FILM COATED ORAL at 15:11

## 2020-06-19 RX ADMIN — QUETIAPINE FUMARATE 200 MG: 200 TABLET, EXTENDED RELEASE ORAL at 21:37

## 2020-06-19 RX ADMIN — LITHIUM CARBONATE 300 MG: 300 TABLET, FILM COATED, EXTENDED RELEASE ORAL at 21:37

## 2020-06-19 RX ADMIN — LITHIUM CARBONATE 300 MG: 300 TABLET, FILM COATED, EXTENDED RELEASE ORAL at 09:03

## 2020-06-19 RX ADMIN — QUETIAPINE FUMARATE 50 MG: 25 TABLET ORAL at 21:37

## 2020-06-19 ASSESSMENT — PAIN DESCRIPTION - PROGRESSION
CLINICAL_PROGRESSION: NOT CHANGED
CLINICAL_PROGRESSION: NOT CHANGED

## 2020-06-19 ASSESSMENT — PAIN DESCRIPTION - FREQUENCY
FREQUENCY: CONTINUOUS
FREQUENCY: CONTINUOUS

## 2020-06-19 ASSESSMENT — PAIN DESCRIPTION - PAIN TYPE
TYPE: ACUTE PAIN
TYPE: ACUTE PAIN

## 2020-06-19 ASSESSMENT — PAIN DESCRIPTION - DESCRIPTORS
DESCRIPTORS: ACHING
DESCRIPTORS: ACHING

## 2020-06-19 ASSESSMENT — PAIN SCALES - GENERAL
PAINLEVEL_OUTOF10: 0
PAINLEVEL_OUTOF10: 0
PAINLEVEL_OUTOF10: 4
PAINLEVEL_OUTOF10: 7
PAINLEVEL_OUTOF10: 0
PAINLEVEL_OUTOF10: 7

## 2020-06-19 ASSESSMENT — PAIN DESCRIPTION - ONSET
ONSET: ON-GOING
ONSET: ON-GOING

## 2020-06-19 ASSESSMENT — PAIN DESCRIPTION - LOCATION
LOCATION: BACK
LOCATION: BACK

## 2020-06-19 NOTE — PLAN OF CARE
Problem: Anger Management/Homicidal Ideation:  Goal: Absence of angry outbursts  Description: Absence of angry outbursts  Outcome: Ongoing  Note: Receiving prn medication to aid in managing increased anxiety/agitation. Reports this \"helps some\". Problem: Pain:  Goal: Pain level will decrease  Description: Pain level will decrease  Outcome: Ongoing  Note: Compliant in obtaining a lumbar back Xray r/t c/o low back pain. Report pain intervention \"It doesn't do much\". Offered ice or heat and declined non-pharmaceutical intervention. Provider aware.

## 2020-06-19 NOTE — PROGRESS NOTES
Department of Psychiatry  Progress Note    Patient's chart was reviewed. Discussed with treatment team. Met with patient. SUBJECTIVE:  Reports that she just wanted her daughter and  to leave her house and they wouldn't. That is why she got the shotgun. Says she never intended to harm anybody, she just wanted them to leave her home. She had not seen her  since the beginning of march and so was surprised and angry when he showed unannounced. Also worries about potentially going to [facility] after this - \"I had plans, everything is ruined, if they hadn't come to my house this would have not happened. \"    Remains elevated, pressured, irritable, and impaired. Limited insight. Tolerating the presumption of medication well and without side effects. Continues with severe pain 2/2 police interaction. Agreed to lumbar xray today.     ROS:   Patient has new complaints: yes - wants to leave  Sleeping adequately:  No:    Appetite adequate: Yes  Engaged in programming: No: isolative    OBJECTIVE:  VITALS:  /65   Pulse 83   Temp 97.6 °F (36.4 °C) (Oral)   Resp 16   Ht 5' 4\" (1.626 m)   Wt 150 lb (68 kg)   SpO2 98%   BMI 25.75 kg/m²     Mental Status Examination:    Appearance: poor grooming and hygiene  Behavior/Attitude toward examiner:  irritable, poor eye contact  Speech: elevated volume   Mood:  \"terrible\"  Affect:  irritable     Thought processes:  more organized  Thought Content: no SI, no HI, less paranoid   Perceptions: not RTIS  Attention: fair  Abstraction: intact  Cognition:  Alert and oriented to person, place, time, and situation, recall intact  Insight: Limited insight   Judgment: Limited judgment     Medication:  Scheduled:   nicotine  1 patch Transdermal Daily    lithium  300 mg Oral 2 times per day    QUEtiapine  200 mg Oral Nightly    levothyroxine  50 mcg Oral Daily        PRN:  benztropine mesylate, magnesium hydroxide, aluminum & magnesium hydroxide-simethicone, nicotine polacrilex, acetaminophen, diazePAM, QUEtiapine, OLANZapine, ibuprofen     FORMULATION:  This is a domiciled, , disabled 59-year-old with  a history of bipolar I disorder, who was brought in by police on a  Statement of Belief to [facility] after a police  standoff in the setting of severe manic symptoms. The patient presents  pressured, irritable, disorganized, and elevated. Given the  significance of yesterday's events and her symptoms, she requires  inpatient stabilization. Principal Problem:    Bipolar 1 disorder (HCC)  Active Problems:    Acquired hypothyroidism    Cannabis use disorder, mild, abuse    Dehydration    Arthralgia  Resolved Problems:    * No resolved hospital problems. *     PLAN:  1. Inpatient stabilization for stabilization and treatment. 2.  On admission, started lithium 300 mg twice daily. Discussed at length with the  patient. She understands that her hypothyroidism is most likely related to  lithium, but believes it has been helpful and can tolerate being on  Synthroid. Also start Seroquel 200 mg p.o. q.h.s. The patient tells me   she was on 450 mg nightly. Ordered every 15-minute checks  for safety, programming, and p.r.n. medication for anxiety, agitation,  and insomnia. 6/19/2020 - check lithium level Monday AM. Order placed. 3.  Internal Medicine consult for admission. Elevated LFTs   - mild  - no acute symptoms   - Can follow up OP     Dehydration  - Encourage PO intake      Hypothyroidism  - suspect non-compliance with medications (reports she hasnt had meds for ~1 year)  - Resume home synthroid dosing   - TSH >12, Free T4 1.1  - Will need OP follow up and repeat labs with PCP      Arthralgias   - reports she was tackled by the police   - Has bruising to bilateral wrists, right knee  - complains of low back pain  - Refuses exam of extremities/back  - agreed to  xrays of lower back  - continue PRN pain control, add lidoderm patch     4. Collateral information for care coordination. 5.  The patient will be admitted on a Statement of Belief, placed on a  hold. Hold expires Monday. Estimated length of stay, 5 to 8 days for stabilization. Total time with patient was 35 minutes and more than 50 % of that time was spent counseling the patient on their symptoms, treatment, and expected goals.      Lydia Oneil MD, [address] [place] [address],Third Floor, [facility]

## 2020-06-19 NOTE — BH NOTE
Abrupt and blunt during interaction. Cussing at staff members. Demanding \"I've been asking for pain medication and something for my nerves\". Received Narco 5/325 mg Po for management of back pain also received Valium 5 mg PO for management of anxiety. Morning meal warmed up and delivered to her in assigned room.

## 2020-06-19 NOTE — BH NOTE
Received onne time dose of Valium 5 mg PO to aid in decreasing anxiety. Informed of new order to receive an Xray of lumbar back.

## 2020-06-20 ENCOUNTER — APPOINTMENT (OUTPATIENT)
Dept: GENERAL RADIOLOGY | Age: 55
DRG: 885 | End: 2020-06-20
Payer: MEDICARE

## 2020-06-20 ENCOUNTER — APPOINTMENT (OUTPATIENT)
Dept: CT IMAGING | Age: 55
DRG: 885 | End: 2020-06-20
Payer: MEDICARE

## 2020-06-20 PROCEDURE — 99233 SBSQ HOSP IP/OBS HIGH 50: CPT | Performed by: PSYCHIATRY & NEUROLOGY

## 2020-06-20 PROCEDURE — 1240000000 HC EMOTIONAL WELLNESS R&B

## 2020-06-20 PROCEDURE — 2500000003 HC RX 250 WO HCPCS: Performed by: PSYCHIATRY & NEUROLOGY

## 2020-06-20 PROCEDURE — 6370000000 HC RX 637 (ALT 250 FOR IP): Performed by: NURSE PRACTITIONER

## 2020-06-20 PROCEDURE — 73502 X-RAY EXAM HIP UNI 2-3 VIEWS: CPT

## 2020-06-20 PROCEDURE — 6370000000 HC RX 637 (ALT 250 FOR IP): Performed by: PSYCHIATRY & NEUROLOGY

## 2020-06-20 PROCEDURE — 99232 SBSQ HOSP IP/OBS MODERATE 35: CPT | Performed by: PHYSICIAN ASSISTANT

## 2020-06-20 PROCEDURE — 6370000000 HC RX 637 (ALT 250 FOR IP): Performed by: PHYSICIAN ASSISTANT

## 2020-06-20 PROCEDURE — 73562 X-RAY EXAM OF KNEE 3: CPT

## 2020-06-20 PROCEDURE — 2580000003 HC RX 258

## 2020-06-20 PROCEDURE — 6360000002 HC RX W HCPCS: Performed by: PHYSICIAN ASSISTANT

## 2020-06-20 PROCEDURE — 70450 CT HEAD/BRAIN W/O DYE: CPT

## 2020-06-20 RX ORDER — KETOROLAC TROMETHAMINE 30 MG/ML
30 INJECTION, SOLUTION INTRAMUSCULAR; INTRAVENOUS EVERY 6 HOURS PRN
Status: DISCONTINUED | OUTPATIENT
Start: 2020-06-20 | End: 2020-06-20

## 2020-06-20 RX ORDER — KETOROLAC TROMETHAMINE 30 MG/ML
30 INJECTION, SOLUTION INTRAMUSCULAR; INTRAVENOUS EVERY 6 HOURS
Status: DISCONTINUED | OUTPATIENT
Start: 2020-06-20 | End: 2020-06-20 | Stop reason: DRUGHIGH

## 2020-06-20 RX ORDER — KETOROLAC TROMETHAMINE 30 MG/ML
30 INJECTION, SOLUTION INTRAMUSCULAR; INTRAVENOUS EVERY 6 HOURS PRN
Status: DISCONTINUED | OUTPATIENT
Start: 2020-06-20 | End: 2020-06-22

## 2020-06-20 RX ORDER — PREDNISONE 20 MG/1
40 TABLET ORAL DAILY
Status: COMPLETED | OUTPATIENT
Start: 2020-06-20 | End: 2020-06-22

## 2020-06-20 RX ORDER — LIDOCAINE 4 G/G
1 PATCH TOPICAL DAILY
Status: DISCONTINUED | OUTPATIENT
Start: 2020-06-20 | End: 2020-06-24 | Stop reason: HOSPADM

## 2020-06-20 RX ORDER — CYCLOBENZAPRINE HCL 10 MG
10 TABLET ORAL 3 TIMES DAILY PRN
Status: DISPENSED | OUTPATIENT
Start: 2020-06-20 | End: 2020-06-22

## 2020-06-20 RX ADMIN — LITHIUM CARBONATE 300 MG: 300 TABLET, FILM COATED, EXTENDED RELEASE ORAL at 12:52

## 2020-06-20 RX ADMIN — QUETIAPINE FUMARATE 50 MG: 25 TABLET ORAL at 22:57

## 2020-06-20 RX ADMIN — QUETIAPINE FUMARATE 200 MG: 200 TABLET, EXTENDED RELEASE ORAL at 21:19

## 2020-06-20 RX ADMIN — LEVOTHYROXINE SODIUM 50 MCG: 50 TABLET ORAL at 07:25

## 2020-06-20 RX ADMIN — OLANZAPINE 10 MG: 10 INJECTION, POWDER, FOR SOLUTION INTRAMUSCULAR at 14:41

## 2020-06-20 RX ADMIN — IBUPROFEN 800 MG: 800 TABLET, FILM COATED ORAL at 12:55

## 2020-06-20 RX ADMIN — PREDNISONE 40 MG: 20 TABLET ORAL at 13:48

## 2020-06-20 RX ADMIN — ACETAMINOPHEN 650 MG: 325 TABLET ORAL at 21:33

## 2020-06-20 RX ADMIN — DIAZEPAM 5 MG: 5 TABLET ORAL at 22:51

## 2020-06-20 RX ADMIN — DIAZEPAM 5 MG: 5 TABLET ORAL at 12:55

## 2020-06-20 RX ADMIN — LITHIUM CARBONATE 300 MG: 300 TABLET, FILM COATED, EXTENDED RELEASE ORAL at 21:19

## 2020-06-20 RX ADMIN — WATER: 1 INJECTION INTRAMUSCULAR; INTRAVENOUS; SUBCUTANEOUS at 15:45

## 2020-06-20 RX ADMIN — CYCLOBENZAPRINE HYDROCHLORIDE 10 MG: 10 TABLET, FILM COATED ORAL at 20:10

## 2020-06-20 RX ADMIN — KETOROLAC TROMETHAMINE 30 MG: 30 INJECTION, SOLUTION INTRAMUSCULAR at 19:01

## 2020-06-20 ASSESSMENT — PAIN DESCRIPTION - PROGRESSION
CLINICAL_PROGRESSION: NOT CHANGED
CLINICAL_PROGRESSION: GRADUALLY WORSENING
CLINICAL_PROGRESSION: NOT CHANGED

## 2020-06-20 ASSESSMENT — PAIN DESCRIPTION - LOCATION: LOCATION: BACK;HIP;KNEE

## 2020-06-20 ASSESSMENT — PAIN DESCRIPTION - PAIN TYPE: TYPE: CHRONIC PAIN

## 2020-06-20 ASSESSMENT — PAIN SCALES - GENERAL
PAINLEVEL_OUTOF10: 7
PAINLEVEL_OUTOF10: 10

## 2020-06-20 ASSESSMENT — PAIN DESCRIPTION - FREQUENCY: FREQUENCY: CONTINUOUS

## 2020-06-20 ASSESSMENT — PAIN - FUNCTIONAL ASSESSMENT: PAIN_FUNCTIONAL_ASSESSMENT: PREVENTS OR INTERFERES WITH MANY ACTIVE NOT PASSIVE ACTIVITIES

## 2020-06-20 ASSESSMENT — PAIN DESCRIPTION - DESCRIPTORS: DESCRIPTORS: DULL;THROBBING

## 2020-06-20 ASSESSMENT — PAIN DESCRIPTION - ONSET: ONSET: ON-GOING

## 2020-06-20 NOTE — BH NOTE
[facility]  Day 3 Interdisciplinary Treatment Plan NOTE    Review Date & Time: 6/20/20 5142    Patient was not in treatment team    Admission Type:   Admission Type: Involuntary    Reason for admission:  Reason for Admission: pt threatened to kill daughter and also commit suicide  Estimated Length of Stay Update:  1 to 2 days  Estimated Discharge Date Update: 6/21/20 to 6/22/20    PATIENT STRENGTHS:  Patient Strengths Strengths: No significant Physical Illness  Patient Strengths and Limitations:Limitations: (MARIBETH)  Addictive Behavior:Addictive Behavior  In the past 3 months, have you felt or has someone told you that you have a problem with:  : (maribeth)  Do you have a history of Chemical Use?: No  Do you have a history of Alcohol Use?: No  Do you have a history of Street Drug Abuse?: Yes  Histroy of Prescripton Drug Abuse?: Yes  Medical Problems:  Past Medical History:   Diagnosis Date    Bipolar affective ([facility] [address]     Boils     Seizures ([facility] [address] [facility]     Thyroid disease        Risk:  Fall RiskTotal: 55  Timothy Scale Timothy Scale Score: 22  BVC Total: 5  Change in scores none.  Changes to plan of Care none    Status EXAM:   Status and Exam  Normal: No  Facial Expression: Exaggerated, Elevated, Hostile  Affect: Inappropriate, Unstable  Level of Consciousness: Alert  Mood:Normal: No  Mood: Anxious, Labile, Angry, Helpless, Irritable  Motor Activity:Normal: No  Motor Activity: Decreased  Interview Behavior: Impulsive, Irritable, Uncooperative/Withdrawn, Evasive  Preception: [place] to Person, [place] to Time, [place] to Place, [place] to Situation  Attention:Normal: No  Attention: Distractible, Unable to Concentrate  Thought Processes: Perseveration  Thought Content:Normal: No  Thought Content: Paranoia, Preoccupations  Hallucinations: None, Other (Comment)(patient denied)  Delusions: Yes  Delusions: Persecution, Obsessions  Memory:Normal: Yes  Memory: Poor Recent, Poor Remote  Insight and Judgment: No  Insight and

## 2020-06-20 NOTE — PROGRESS NOTES
Patient received tylenol 650 mg po for c/o back pain that she rated at a 4. Patient was medicated with valium 5 mg po for anxiety related to acting out behaviors earlier in the shift, and inability to control her emotions. seroquel 50 mg po was given to patient for agitation.

## 2020-06-20 NOTE — BH NOTE
Medical PA Faith Finley notified of unwitnessed fall and patient complaining of left head pain and right knee pain. CT ordered of head. No xray of knee ordered per Lenn Hammans due to recent injury and x ray of knee WNL. No neuro checks ordered by Lenn Hammans either. Dr. Modena Closs notified and Director of [facility] notified. Will continue to monitor.

## 2020-06-20 NOTE — BH NOTE
At [address] transport arrived to take [facility] for CT and xray. Writer and transport at bedside to assist patient to wheelchair and take off unit. Writer asked patient if she needed assistance pivoting from bed to chair and she stated no. When she pivoted, she went to sit too quickly and missed the chair and landed flat on her buttocks with medium force. She immediately cursed at transport calling her \"tubby\" and that she \"should have scooted the chair closer and that she was going to be sued. \"  Chair was locked and close to bedside to provide safety and patient did look at chair before she sat down. Writer summoned small side nurse Saranya Purdy [id number] and we provided a 2 person assist off of ground and into chair with no acute complaint. [facility] was then taken to CT and Xray where she remained irritable and impatient. Upon return to unit, she was given her PRN toradol IM to right buttock for continued pain to back and hip. Patient was offered flexaril prior to Xray but elected to wait until she was back from xray to get injection instead.

## 2020-06-20 NOTE — BH NOTE
Zyprexa given IM to right deltoid for extreme agitation as evidence by yelling at top of lungs, cursing at staff, demanding additional pain medications per recommendation of Dr. Brandi Coreas. She also ordered for patient to keep her door open. BP 11/56, P 104. Patient was also moved to small side of unit due to extreme disruptive behavior.

## 2020-06-20 NOTE — PROGRESS NOTES
Brief Progress Note    Date: 06/20/20    Subjective: reported falling when she was getting out of bed 2/2 difficulty with ambulation 2/2 pain. Pt reports she was tackled by police and complains of right hip pain with sciatic nerve pain. Denies any nausea, vomiting, LOC, vision changes. Objective:  Vitals:    06/20/20 0837   BP: 117/63   Pulse: 75   Resp: 16   Temp: 97.7 °F (36.5 °C)   SpO2: 95%       Physical Exam:  General: Middle aged  female, irritable  Resp: CTA  Cardio: RRR  MSK: pt uncooperative w/ exam, refused to ambulate, roll over or allow this provider to examine her 2/2 pain; she does report she is able to move but does not want to 2/2 pain  Neuro: Non-focal on exam, CN II-XII intact  Skin: scattered areas of healing ecchymosis noted on UE bilaterally    Assessment:  1. Right Hip Pain with Sciatica  2. Unwitnessed Fall  3. Left Forehead Pain    Plan:    1. Check CT head, XR right hip  2. Fall precautions  3.  Stop Ibuprofen, start [facility] IM Toradol, Lidocaine patch, short burst of Prednisone and PRN muscle relaxer    Laine Steele PA-C 2:34 PM 6/20/2020

## 2020-06-21 PROCEDURE — 2580000003 HC RX 258

## 2020-06-21 PROCEDURE — 6360000002 HC RX W HCPCS: Performed by: PHYSICIAN ASSISTANT

## 2020-06-21 PROCEDURE — 2500000003 HC RX 250 WO HCPCS: Performed by: PSYCHIATRY & NEUROLOGY

## 2020-06-21 PROCEDURE — 6370000000 HC RX 637 (ALT 250 FOR IP): Performed by: PSYCHIATRY & NEUROLOGY

## 2020-06-21 PROCEDURE — 6370000000 HC RX 637 (ALT 250 FOR IP): Performed by: PHYSICIAN ASSISTANT

## 2020-06-21 PROCEDURE — 1240000000 HC EMOTIONAL WELLNESS R&B

## 2020-06-21 RX ADMIN — LITHIUM CARBONATE 300 MG: 300 TABLET, FILM COATED, EXTENDED RELEASE ORAL at 09:11

## 2020-06-21 RX ADMIN — KETOROLAC TROMETHAMINE 30 MG: 30 INJECTION, SOLUTION INTRAMUSCULAR at 01:59

## 2020-06-21 RX ADMIN — KETOROLAC TROMETHAMINE 30 MG: 30 INJECTION, SOLUTION INTRAMUSCULAR at 16:15

## 2020-06-21 RX ADMIN — OLANZAPINE 10 MG: 10 INJECTION, POWDER, FOR SOLUTION INTRAMUSCULAR at 12:47

## 2020-06-21 RX ADMIN — QUETIAPINE FUMARATE 50 MG: 25 TABLET ORAL at 16:56

## 2020-06-21 RX ADMIN — DIAZEPAM 5 MG: 5 TABLET ORAL at 23:10

## 2020-06-21 RX ADMIN — WATER 2.1 ML: 1 INJECTION INTRAMUSCULAR; INTRAVENOUS; SUBCUTANEOUS at 14:58

## 2020-06-21 RX ADMIN — KETOROLAC TROMETHAMINE 30 MG: 30 INJECTION, SOLUTION INTRAMUSCULAR at 23:10

## 2020-06-21 RX ADMIN — DIAZEPAM 5 MG: 5 TABLET ORAL at 09:27

## 2020-06-21 RX ADMIN — QUETIAPINE FUMARATE 200 MG: 200 TABLET, EXTENDED RELEASE ORAL at 20:54

## 2020-06-21 RX ADMIN — CYCLOBENZAPRINE HYDROCHLORIDE 10 MG: 10 TABLET, FILM COATED ORAL at 21:14

## 2020-06-21 RX ADMIN — LITHIUM CARBONATE 300 MG: 300 TABLET, FILM COATED, EXTENDED RELEASE ORAL at 20:54

## 2020-06-21 RX ADMIN — CYCLOBENZAPRINE HYDROCHLORIDE 10 MG: 10 TABLET, FILM COATED ORAL at 16:15

## 2020-06-21 RX ADMIN — KETOROLAC TROMETHAMINE 30 MG: 30 INJECTION, SOLUTION INTRAMUSCULAR at 09:12

## 2020-06-21 RX ADMIN — PREDNISONE 40 MG: 20 TABLET ORAL at 09:11

## 2020-06-21 RX ADMIN — DIAZEPAM 5 MG: 5 TABLET ORAL at 16:56

## 2020-06-21 RX ADMIN — LEVOTHYROXINE SODIUM 50 MCG: 50 TABLET ORAL at 09:25

## 2020-06-21 RX ADMIN — ACETAMINOPHEN 650 MG: 325 TABLET ORAL at 18:17

## 2020-06-21 ASSESSMENT — PAIN DESCRIPTION - FREQUENCY
FREQUENCY: CONTINUOUS

## 2020-06-21 ASSESSMENT — PAIN SCALES - GENERAL
PAINLEVEL_OUTOF10: 8
PAINLEVEL_OUTOF10: 7
PAINLEVEL_OUTOF10: 6
PAINLEVEL_OUTOF10: 8
PAINLEVEL_OUTOF10: 7
PAINLEVEL_OUTOF10: 8

## 2020-06-21 ASSESSMENT — PAIN DESCRIPTION - LOCATION
LOCATION: BACK;HEAD
LOCATION: BACK
LOCATION: BACK;HIP;KNEE

## 2020-06-21 ASSESSMENT — PAIN DESCRIPTION - ORIENTATION
ORIENTATION: ANTERIOR;LOWER
ORIENTATION: RIGHT;LOWER
ORIENTATION: LOWER

## 2020-06-21 ASSESSMENT — PAIN DESCRIPTION - DESCRIPTORS
DESCRIPTORS: DULL;THROBBING
DESCRIPTORS: ACHING
DESCRIPTORS: ACHING

## 2020-06-21 ASSESSMENT — PAIN - FUNCTIONAL ASSESSMENT
PAIN_FUNCTIONAL_ASSESSMENT: PREVENTS OR INTERFERES WITH MANY ACTIVE NOT PASSIVE ACTIVITIES

## 2020-06-21 ASSESSMENT — PAIN DESCRIPTION - ONSET
ONSET: ON-GOING

## 2020-06-21 ASSESSMENT — PAIN DESCRIPTION - PAIN TYPE
TYPE: ACUTE PAIN
TYPE: ACUTE PAIN
TYPE: CHRONIC PAIN

## 2020-06-21 ASSESSMENT — PAIN DESCRIPTION - PROGRESSION
CLINICAL_PROGRESSION: NOT CHANGED

## 2020-06-21 NOTE — PROGRESS NOTES
Department of Psychiatry  Progress Note    Patient's chart was reviewed. Discussed with treatment team. Met with patient. SUBJECTIVE:  Reports that she just wants ppl to leave her alone. 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 Pt was placed on small side to reduce agitation especially because she had an unwitnessed fall. Pt is yelling refusing care at this time. ROS:   Patient has new complaints: yes - wants to leave  Sleeping adequately:  Yes sleeps all day unless when wanting something for pain   Appetite adequate: Yes  Engaged in programming: No: isolative    OBJECTIVE:  VITALS:  /74   Pulse 67   Temp 97.8 °F (36.6 °C) (Infrared)   Resp 16   Ht 5' 4\" (1.626 m)   Wt 150 lb (68 kg)   SpO2 97%   BMI 25.75 kg/m²     Mental Status Examination:    Appearance: fair grooming and hygiene  Behavior/Attitude toward examiner:  irritable, poor eye contact  Speech: loud and profane  Mood:  \"leave me alone\"  Affect:  irritable     Thought processes: organized  Thought Content: no SI, no HI, no delusional context noted  Perceptions: not RTIS  Attention: fair  Abstraction: intact  Cognition:  Alert and oriented to person, place, time, and situation, recall intact  Insight: Limited insight   Judgment: Limited judgment     Medication:  Scheduled:   lidocaine  1 patch Transdermal Daily    predniSONE  40 mg Oral Daily    nicotine  1 patch Transdermal Daily    lithium  300 mg Oral 2 times per day    QUEtiapine  200 mg Oral Nightly    levothyroxine  50 mcg Oral Daily        PRN:  cyclobenzaprine, ketorolac, benztropine mesylate, magnesium hydroxide, aluminum & magnesium hydroxide-simethicone, nicotine polacrilex, acetaminophen, diazePAM, QUEtiapine, OLANZapine       Will cont meds as prescribed    PLAN:  1. Inpatient stabilization for stabilization and treatment. 2.  discharge per primary team     3. Internal Medicine consult for admission. rec appreciated    4. Collateral information for care coordination. 5.  The patient will be admitted on a Statement of Belief, placed on a  hold. Hold expires Monday. Estimated length of stay,per primary team    Total time with patient was 40 minutes and more than 50 % of that time was spent counseling the patient on their symptoms, treatment, and expected goals.

## 2020-06-21 NOTE — BH NOTE
Pt has had multiple episodes of screaming and verbal aggression toward staff throughout shift. Pt was given seroquel and valium this afternoon to help her calm during a particularly disruptive episode of rage and yelling at a peer.

## 2020-06-21 NOTE — PROGRESS NOTES
Patient was upset due to not being able to shower. Writer explained to patient that she had just falling twice & she have 2 staff help her & there was not 2 staff available. Patient was also instructed that other patients were trying to sleep. \"I haven't had a shower in a week. \" Patient would not take no for an answer. Patient was given valium 5 mg po at 22:51. Patient continued to demand a shower & was becoming more agitated & was given seroquel 50 mg po at 22:57.  Levy Sutherland R.N.

## 2020-06-22 PROCEDURE — 6360000002 HC RX W HCPCS: Performed by: PHYSICIAN ASSISTANT

## 2020-06-22 PROCEDURE — 97165 OT EVAL LOW COMPLEX 30 MIN: CPT

## 2020-06-22 PROCEDURE — 99233 SBSQ HOSP IP/OBS HIGH 50: CPT | Performed by: PSYCHIATRY & NEUROLOGY

## 2020-06-22 PROCEDURE — 1240000000 HC EMOTIONAL WELLNESS R&B

## 2020-06-22 PROCEDURE — 97535 SELF CARE MNGMENT TRAINING: CPT

## 2020-06-22 PROCEDURE — 6370000000 HC RX 637 (ALT 250 FOR IP): Performed by: PHYSICIAN ASSISTANT

## 2020-06-22 PROCEDURE — 6370000000 HC RX 637 (ALT 250 FOR IP): Performed by: NURSE PRACTITIONER

## 2020-06-22 PROCEDURE — 6370000000 HC RX 637 (ALT 250 FOR IP): Performed by: PSYCHIATRY & NEUROLOGY

## 2020-06-22 RX ORDER — NAPROXEN 500 MG/1
500 TABLET ORAL 2 TIMES DAILY WITH MEALS
Status: DISCONTINUED | OUTPATIENT
Start: 2020-06-22 | End: 2020-06-24 | Stop reason: HOSPADM

## 2020-06-22 RX ADMIN — ALUMINUM HYDROXIDE, MAGNESIUM HYDROXIDE, AND SIMETHICONE 30 ML: 200; 200; 20 SUSPENSION ORAL at 17:48

## 2020-06-22 RX ADMIN — KETOROLAC TROMETHAMINE 30 MG: 30 INJECTION, SOLUTION INTRAMUSCULAR at 13:46

## 2020-06-22 RX ADMIN — CYCLOBENZAPRINE HYDROCHLORIDE 10 MG: 10 TABLET, FILM COATED ORAL at 09:00

## 2020-06-22 RX ADMIN — LITHIUM CARBONATE 300 MG: 300 TABLET, FILM COATED, EXTENDED RELEASE ORAL at 21:01

## 2020-06-22 RX ADMIN — NAPROXEN 500 MG: 500 TABLET ORAL at 17:48

## 2020-06-22 RX ADMIN — PREDNISONE 40 MG: 20 TABLET ORAL at 09:00

## 2020-06-22 RX ADMIN — DIAZEPAM 5 MG: 5 TABLET ORAL at 22:32

## 2020-06-22 RX ADMIN — QUETIAPINE FUMARATE 200 MG: 200 TABLET, EXTENDED RELEASE ORAL at 21:01

## 2020-06-22 RX ADMIN — KETOROLAC TROMETHAMINE 30 MG: 30 INJECTION, SOLUTION INTRAMUSCULAR at 07:22

## 2020-06-22 RX ADMIN — LITHIUM CARBONATE 300 MG: 300 TABLET, FILM COATED, EXTENDED RELEASE ORAL at 09:01

## 2020-06-22 RX ADMIN — LEVOTHYROXINE SODIUM 50 MCG: 50 TABLET ORAL at 07:05

## 2020-06-22 ASSESSMENT — PAIN DESCRIPTION - ORIENTATION: ORIENTATION: LOWER

## 2020-06-22 ASSESSMENT — PAIN DESCRIPTION - DESCRIPTORS: DESCRIPTORS: ACHING

## 2020-06-22 ASSESSMENT — PAIN SCALES - GENERAL
PAINLEVEL_OUTOF10: 9
PAINLEVEL_OUTOF10: 9
PAINLEVEL_OUTOF10: 8

## 2020-06-22 ASSESSMENT — PAIN DESCRIPTION - PAIN TYPE: TYPE: ACUTE PAIN

## 2020-06-22 ASSESSMENT — PAIN DESCRIPTION - FREQUENCY: FREQUENCY: CONTINUOUS

## 2020-06-22 ASSESSMENT — PAIN DESCRIPTION - PROGRESSION: CLINICAL_PROGRESSION: NOT CHANGED

## 2020-06-22 ASSESSMENT — PAIN DESCRIPTION - LOCATION: LOCATION: BACK

## 2020-06-22 ASSESSMENT — PAIN - FUNCTIONAL ASSESSMENT: PAIN_FUNCTIONAL_ASSESSMENT: PREVENTS OR INTERFERES SOME ACTIVE ACTIVITIES AND ADLS

## 2020-06-22 ASSESSMENT — PAIN DESCRIPTION - ONSET: ONSET: ON-GOING

## 2020-06-22 NOTE — SIGNIFICANT EVENT
Pt admitted with multiple bruises present. Patient gives permission to take photos and saw all photos before they were saved.  Priyank Christianson Clinical

## 2020-06-22 NOTE — PROGRESS NOTES
Department of Psychiatry  Progress Note    Patient's chart was reviewed. Discussed with treatment team. Met with patient. SUBJECTIVE:  Improving - less pressured, less irritable, more organized. Continues with pain 2/2 but under better control. Did not receive lab draw this morning for lithium level. More appropriate during conversation. ROS:   Patient has new complaints: no  Sleeping adequately:  better  Appetite adequate: Yes  Engaged in programming: no    OBJECTIVE:  VITALS:  /76   Pulse 75   Temp 98.4 °F (36.9 °C) (Oral)   Resp 16   Ht 5' 4\" (1.626 m)   Wt 150 lb (68 kg)   SpO2 96%   BMI 25.75 kg/m²     Mental Status Examination:    Appearance: improved grooming and hygiene  Behavior/Attitude toward examiner:  Improved eye contact  Speech: less pressured  Mood:  \"ok\"  Affect:  less irritable     Thought processes:  more organized  Thought Content: no SI, no HI, less paranoid   Perceptions: not RTIS  Attention: fair  Abstraction: intact  Cognition:  Alert and oriented to person, place, time, and situation, recall intact  Insight: improving   Judgment: improving    Medication:  Scheduled:   naproxen  500 mg Oral BID WC    lidocaine  1 patch Transdermal Daily    nicotine  1 patch Transdermal Daily    lithium  300 mg Oral 2 times per day    QUEtiapine  200 mg Oral Nightly    levothyroxine  50 mcg Oral Daily        PRN:  benztropine mesylate, magnesium hydroxide, aluminum & magnesium hydroxide-simethicone, nicotine polacrilex, acetaminophen, diazePAM, QUEtiapine, OLANZapine     FORMULATION:  This is a domiciled, , disabled 53-year-old with  a history of bipolar I disorder, who was brought in by police on a  Statement of Belief to [facility] after a police  standoff in the setting of severe manic symptoms. The patient presents  pressured, irritable, disorganized, and elevated.   Given the  significance of yesterday's events and her symptoms, she requires  inpatient stabilization. Principal Problem:    Bipolar 1 disorder (HCC)  Active Problems:    Acquired hypothyroidism    Cannabis use disorder, mild, abuse    Dehydration    Arthralgia    Fall    Right hip pain    Forehead pain  Resolved Problems:    * No resolved hospital problems. *     PLAN:  1. Inpatient stabilization for stabilization and treatment. 2.  On admission, started lithium 300 mg twice daily. Discussed at length with the  patient. She understands that her hypothyroidism is most likely related to  lithium, but believes it has been helpful and can tolerate being on  Synthroid. Also started Seroquel 200 mg p.o. q.h.s. The patient tells me   she was on 450 mg nightly. Ordered every 15-minute checks  for safety, programming, and p.r.n. medication for anxiety, agitation,  and insomnia. 2020 - lithium, BMP, and TSH ordered for tomorrow. 3.  Internal Medicine consult for admission. Elevated LFTs   - mild  - no acute symptoms   - Can follow up OP     Dehydration  - Encouraged PO intake      Hypothyroidism  - suspect non-compliance with medications (reports she hasnt had meds for ~1 year)  - Resume home synthroid dosing   - TSH >12, Free T4 1.1  - Will need OP follow up and repeat labs with PCP      Arthralgias   - reports she was tackled by the police   - Has bruising to bilateral wrists, right knee  - low back pain  -  pain control    4. The patient admitted on a Statement of Belief and placed on a hold. her hold  today but she is willing to stay voluntarily. Total time with patient was 35 minutes and more than 50 % of that time was spent counseling the patient on their symptoms, treatment, and expected goals.      Charissa Meza MD, [facility] [place],Third Floor, [place]

## 2020-06-22 NOTE — PROGRESS NOTES
cooking  Medication Management:  Self; pt reports occasional difficulty remembering meds. Educated pt on memory strategies to take medications. Pt. verbalized understanding. Health Management:  Pt. Reports that she does not have a PCP, Psychiatrist or therapist.  Social Network:   none  Stressors:  1. \" People coming to bother me at my house. \"  2. Relationships. 3. Government incompetency. Coping Skills:  Smoke pot, play with the geese and ducks, cooking    Pain  [x]Yes  []No  Ratin:10  Location:  lower back and right hip; \"I can't put any weight on that right leg. \"  Pain Medicine Status: [] Denies need  [] Pain med requested  [x] RN notified. Cognition    A&Ox4, patient appropriate and cooperative. Follows []1 step and [x] 3 step commands. Decreased awareness of errors. Decreased safety awareness. Upper Extremity ROM:    WFL, pt able to perform all bed mobility, transfers, and gait without ROM limitation. Upper Extremity Strength:    WFL, pt able to perform all bed mobility, transfers, and gait without strength limitation. Upper Extremity Sensation:    No apparent deficits. Upper Extremity Proprioception:    No apparent deficits. Skin Integrity:  Bruising on UB. Nsg aware. Coordination and Tone:  WFL    Balance  Static Sitting:  [x] Good [] Fair [] Poor  Dynamic Sitting:  [x] Good [] Fair [] Poor  Static Standing: [] Good [] Fair [x] Poor  Dynamic Standing: [] Good [] Fair [x] Poor    Bed mobility:  Mod I  Supine to sit:  Sit to supine:  Scooting to head of bed:  Scooting in sitting:  Rolling:  Bridging:    Transfers:    Sit to stand: CGA sit pivot  Stand to sit: CGA sit pivot  Bed/Chair to/from toilet:  CGA sit pivot W/C <> std toilet    Self Care: Toileting:  CGA  Grooming:  CGA  Dressing:  CGA    Exercise / Activities Initiated:   Pt. Educated on role of OT. Pt. Participated in:  Eval, ADL retraining, tsfer training.     Assessment of Deficits:   Pt demonstrated decreased activity tolerance, decreased safety awareness, decreased balance,  decreased bed mobility, decreased transfer skills, and decreased ADL/IADL status. Pt. Limited during evaluation by decreased cognition. At end of evaluation, pt. In room. Goal(s) : To be met in 3 Visits:  1). Pt. To complete ACLS. 2). Pt. To verbalize understanding of sleep hygiene education. 3). Mod I for toilet transfers SW level. To be met in 5 Visits:  1). Pt. To complete 1 SMART long term goal and 2 SMART short term goals with mod assist.  2). Pt. To verbalize 3 new coping skills. 3). Pt. To complete interest check list.    4). Pt. To verbalize understanding of 3 communication styles. 5). Pt. To complete wellness plan. 6). Pt. To complete a daily schedule of healthy activities/routines with mod assist.   7). Pt. To identify 2 memory strategies to take medications as prescribed. 8). Mod I for functional transfers SW level. 9). Mod I for LB ADLs, SW level. Rehabilitation Potential:  Good for goals listed above. Strengths for achieving goals include:  PLOF  Barriers to achieving goals include:  Decreased Cognition     Plan: To be seen 2-5x/week while in acute care setting for therapeutic exercises/act, ADL retraining, NMR and patient/caregiver ed/instruction.      Timed Code Treatment Minutes:   39  minutes    Total Treatment Time:    49  minutes    Signature and License Number    Ilya Ornelas OTR/L  #[id number]      If patient discharges from this facility prior to next visit, this note will serve as the Discharge Summary

## 2020-06-23 LAB
ANION GAP SERPL CALCULATED.3IONS-SCNC: 11 MMOL/L (ref 3–16)
BUN BLDV-MCNC: 11 MG/DL (ref 7–20)
CALCIUM SERPL-MCNC: 10.1 MG/DL (ref 8.3–10.6)
CHLORIDE BLD-SCNC: 96 MMOL/L (ref 99–110)
CO2: 25 MMOL/L (ref 21–32)
CREAT SERPL-MCNC: 0.7 MG/DL (ref 0.6–1.1)
GFR AFRICAN AMERICAN: >60
GFR NON-AFRICAN AMERICAN: >60
GLUCOSE BLD-MCNC: 96 MG/DL (ref 70–99)
LITHIUM DOSE AMOUNT: ABNORMAL
LITHIUM LEVEL: 1.3 MMOL/L (ref 0.6–1.2)
POTASSIUM SERPL-SCNC: 3.8 MMOL/L (ref 3.5–5.1)
SODIUM BLD-SCNC: 132 MMOL/L (ref 136–145)
TSH SERPL DL<=0.05 MIU/L-ACNC: 6.57 UIU/ML (ref 0.27–4.2)

## 2020-06-23 PROCEDURE — 36415 COLL VENOUS BLD VENIPUNCTURE: CPT

## 2020-06-23 PROCEDURE — 84443 ASSAY THYROID STIM HORMONE: CPT

## 2020-06-23 PROCEDURE — 97530 THERAPEUTIC ACTIVITIES: CPT

## 2020-06-23 PROCEDURE — 1240000000 HC EMOTIONAL WELLNESS R&B

## 2020-06-23 PROCEDURE — 99233 SBSQ HOSP IP/OBS HIGH 50: CPT | Performed by: PSYCHIATRY & NEUROLOGY

## 2020-06-23 PROCEDURE — 97542 WHEELCHAIR MNGMENT TRAINING: CPT

## 2020-06-23 PROCEDURE — 80178 ASSAY OF LITHIUM: CPT

## 2020-06-23 PROCEDURE — 80048 BASIC METABOLIC PNL TOTAL CA: CPT

## 2020-06-23 PROCEDURE — 97162 PT EVAL MOD COMPLEX 30 MIN: CPT

## 2020-06-23 PROCEDURE — 97116 GAIT TRAINING THERAPY: CPT

## 2020-06-23 PROCEDURE — 6370000000 HC RX 637 (ALT 250 FOR IP): Performed by: PSYCHIATRY & NEUROLOGY

## 2020-06-23 PROCEDURE — 97535 SELF CARE MNGMENT TRAINING: CPT

## 2020-06-23 PROCEDURE — 6370000000 HC RX 637 (ALT 250 FOR IP): Performed by: PHYSICIAN ASSISTANT

## 2020-06-23 PROCEDURE — 6370000000 HC RX 637 (ALT 250 FOR IP): Performed by: NURSE PRACTITIONER

## 2020-06-23 RX ORDER — CYCLOBENZAPRINE HCL 10 MG
10 TABLET ORAL 3 TIMES DAILY PRN
Status: DISCONTINUED | OUTPATIENT
Start: 2020-06-23 | End: 2020-06-24 | Stop reason: HOSPADM

## 2020-06-23 RX ORDER — LITHIUM CARBONATE 300 MG/1
300 TABLET, FILM COATED, EXTENDED RELEASE ORAL EVERY 12 HOURS SCHEDULED
Status: DISCONTINUED | OUTPATIENT
Start: 2020-06-24 | End: 2020-06-24

## 2020-06-23 RX ADMIN — CYCLOBENZAPRINE HYDROCHLORIDE 10 MG: 10 TABLET, FILM COATED ORAL at 23:31

## 2020-06-23 RX ADMIN — LITHIUM CARBONATE 300 MG: 300 TABLET, FILM COATED, EXTENDED RELEASE ORAL at 08:20

## 2020-06-23 RX ADMIN — NAPROXEN 500 MG: 500 TABLET ORAL at 08:20

## 2020-06-23 RX ADMIN — QUETIAPINE FUMARATE 200 MG: 200 TABLET, EXTENDED RELEASE ORAL at 20:49

## 2020-06-23 RX ADMIN — DIAZEPAM 5 MG: 5 TABLET ORAL at 20:49

## 2020-06-23 RX ADMIN — ALUMINUM HYDROXIDE, MAGNESIUM HYDROXIDE, AND SIMETHICONE 30 ML: 200; 200; 20 SUSPENSION ORAL at 01:52

## 2020-06-23 RX ADMIN — LEVOTHYROXINE SODIUM 50 MCG: 50 TABLET ORAL at 06:11

## 2020-06-23 RX ADMIN — CYCLOBENZAPRINE HYDROCHLORIDE 10 MG: 10 TABLET, FILM COATED ORAL at 17:39

## 2020-06-23 RX ADMIN — NAPROXEN 500 MG: 500 TABLET ORAL at 17:39

## 2020-06-23 RX ADMIN — CYCLOBENZAPRINE HYDROCHLORIDE 10 MG: 10 TABLET, FILM COATED ORAL at 10:30

## 2020-06-23 RX ADMIN — DIAZEPAM 5 MG: 5 TABLET ORAL at 08:59

## 2020-06-23 ASSESSMENT — PAIN DESCRIPTION - PROGRESSION
CLINICAL_PROGRESSION: GRADUALLY IMPROVING
CLINICAL_PROGRESSION: NOT CHANGED
CLINICAL_PROGRESSION: GRADUALLY IMPROVING
CLINICAL_PROGRESSION: NOT CHANGED
CLINICAL_PROGRESSION: GRADUALLY IMPROVING
CLINICAL_PROGRESSION: GRADUALLY IMPROVING
CLINICAL_PROGRESSION: GRADUALLY WORSENING

## 2020-06-23 ASSESSMENT — PAIN DESCRIPTION - DESCRIPTORS
DESCRIPTORS: ACHING

## 2020-06-23 ASSESSMENT — PAIN DESCRIPTION - PAIN TYPE
TYPE: CHRONIC PAIN

## 2020-06-23 ASSESSMENT — PAIN DESCRIPTION - LOCATION
LOCATION: BACK

## 2020-06-23 ASSESSMENT — PAIN DESCRIPTION - ONSET
ONSET: ON-GOING

## 2020-06-23 ASSESSMENT — PAIN DESCRIPTION - ORIENTATION
ORIENTATION: LOWER

## 2020-06-23 ASSESSMENT — PAIN - FUNCTIONAL ASSESSMENT
PAIN_FUNCTIONAL_ASSESSMENT: PREVENTS OR INTERFERES SOME ACTIVE ACTIVITIES AND ADLS

## 2020-06-23 ASSESSMENT — PAIN SCALES - GENERAL
PAINLEVEL_OUTOF10: 8
PAINLEVEL_OUTOF10: 6
PAINLEVEL_OUTOF10: 8
PAINLEVEL_OUTOF10: 7
PAINLEVEL_OUTOF10: 9
PAINLEVEL_OUTOF10: 9

## 2020-06-23 ASSESSMENT — PAIN DESCRIPTION - FREQUENCY
FREQUENCY: CONTINUOUS

## 2020-06-23 NOTE — PLAN OF CARE
Problem: Anger Management/Homicidal Ideation:  Goal: Ability to verbalize frustrations and anger appropriately will improve  Description: Ability to verbalize frustrations and anger appropriately will improve  Outcome: Ongoing  Patient A & Ox 3 and does not seem to understand the situation she is getting ready to face by going to [facility]. She made comments that seemed to blame the daughter for her being here and not taking any ownership to her being here. She has been withdrawn to her bed and room for most of the shift. She has been focused on getting orders to restart pain medication and focused on her pain. She denies SI/HI/A/V/H. She did not make any delusional statements while interacting with staff. She was cooperative with taking medications and made multiple request for prn medications. Patient absence of angry outbursts at present.

## 2020-06-23 NOTE — PROGRESS NOTES
Patient requesting some Valium to help with her agitation and irritability over her pain she is experiencing. She asked for her Naprosyn, but was informed she already received it. She would like staff to ask about getting her Flexeril and Toradol back. Writer informed her they could ask about that.
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
## 2020-06-23 NOTE — PROGRESS NOTES
Patient called for assistance to the bathroom. She is cooperative and appreciative of all help this evening. She has voided qs. When getting her out of w/c patient is transferring well from bed to w/c and from w/c to bed. She did have complaint of indigestion and she asked for and received maalox 30 ml po for complaint. Patient was asked to notify the staff of any needs through out the night. She is in agreement with plan.

## 2020-06-23 NOTE — PROGRESS NOTES
Inpatient Occupational Therapy  Evaluation and Treatment    Unit:  [facility]  Date:  2020  Patient Name:    Asiya Kirby  Admitting diagnosis:  Bipolar 1 disorder [facility]) [F31.9]  Admit Date:  2020  Precautions/Restrictions/WB Status/ Lines/ Wounds/ Oxygen:  Up as tolerated, w/c privilege, seizure precautions, fall precautions  Treatment Time:  11:15-11:56  Treatment Number:  2    Subjective:  Pt. Received in bed and agreeable to therapy session. Recommendations for Nsg:  Pt. Would benefit from CGA for tsfers, LB ADLs, BSC over toilet and TTB for shower. Nsg notified. Discharge Recommendations:   [x] 24/7 assist; pt would benefit from continue skilled therapy to improve functional independence and safety. DME needs for discharge:   Pt. May benefit from TTB at home, and SW.     AM-PAC Score: 20     Home Health S4 Level: [x] NA   [] Level 1- Standard  []  Level 2- Social  [] Level 3- Safety  []  Level 4- Sick    ACLS:  TBA    Pain  [x]Yes  []No  Ratin:10  Location:  lower back and right hip; \"I can't put any weight on that right leg. \"  Pain Medicine Status: [] Denies need  [] Pain med requested  [x] RN notified. Cognition    A&Ox4, patient appropriate and cooperative. Follows []1 step and [x] 3 step commands. Decreased awareness of errors. Decreased safety awareness. ADL Retraining:    Functional Transfer training:  W/C <> BSC over toilet:  Min assist initially then improved to SBA. Interest Checklist [facility] (Adapted Version):  Health and Fitness:1 like and 1 want to try  Sports: 1  Creative: 2 likes/ok  Productivity at home:  4 likes   Leisure at home:  6 likes  Social:  1 like, 1 want to try  Outdoor pursuits:  7 likes/ok  Out and About:  5 likes; 1 want to try  Educational:  5 likes    Assessment:   Pt participated in ADL retraining, functional tsfer training and interest checklist.  Pt. Making progress improving safety and independence with toilet transfers.   Pt. Initially required min assist for stand pivot tsfer WC <> BSC over toilet. Pt. Required vc for WC set-up, sequencing and safety. Pt. improved to SBA after several trials. Pt should continue to receive CGA from nsg and continued education for reinforcement of proper tsfer technique. Pt. participated well with interest checklist activity. Pt. Meet one goal today. Pt. Limited during evaluation by decreased cognition. At end of tx, pt. In room. Goal(s) : To be met in 3 Visits:  1). Pt. To complete ACLS. 2). Pt. To verbalize understanding of sleep hygiene education. 3). Mod I for toilet transfers SW level. To be met in 5 Visits:  1). Pt. To complete 1 SMART long term goal and 2 SMART short term goals with mod assist.  2). Pt. To verbalize 3 new coping skills. 3). Pt. To complete interest check list.  (Goal Met 6/23/2020)  4). Pt. To verbalize understanding of 3 communication styles. 5). Pt. To complete wellness plan. 6). Pt. To complete a daily schedule of healthy activities/routines with mod assist.   7). Pt. To identify 2 memory strategies to take medications as prescribed. 8). Mod I for functional transfers SW level. 9). Mod I for LB ADLs, SW level. Plan:  Continue OT per POC.      Timed Code Treatment Minutes:   41  minutes    Total Treatment Time:   41   minutes    Signature and License Number    Jasson Burnette OTR/L  #[id number]      If patient discharges from this facility prior to next visit, this note will serve as the Discharge Summary

## 2020-06-24 VITALS
HEART RATE: 65 BPM | OXYGEN SATURATION: 97 % | TEMPERATURE: 97.5 F | DIASTOLIC BLOOD PRESSURE: 46 MMHG | HEIGHT: 64 IN | WEIGHT: 150 LBS | BODY MASS INDEX: 25.61 KG/M2 | RESPIRATION RATE: 14 BRPM | SYSTOLIC BLOOD PRESSURE: 91 MMHG

## 2020-06-24 LAB
LITHIUM DOSE AMOUNT: NORMAL
LITHIUM LEVEL: 1.1 MMOL/L (ref 0.6–1.2)

## 2020-06-24 PROCEDURE — 36415 COLL VENOUS BLD VENIPUNCTURE: CPT

## 2020-06-24 PROCEDURE — 99239 HOSP IP/OBS DSCHRG MGMT >30: CPT | Performed by: PSYCHIATRY & NEUROLOGY

## 2020-06-24 PROCEDURE — 6370000000 HC RX 637 (ALT 250 FOR IP): Performed by: PHYSICIAN ASSISTANT

## 2020-06-24 PROCEDURE — 5130000000 HC BRIDGE APPOINTMENT

## 2020-06-24 PROCEDURE — 6370000000 HC RX 637 (ALT 250 FOR IP): Performed by: NURSE PRACTITIONER

## 2020-06-24 PROCEDURE — 80178 ASSAY OF LITHIUM: CPT

## 2020-06-24 PROCEDURE — 6370000000 HC RX 637 (ALT 250 FOR IP): Performed by: PSYCHIATRY & NEUROLOGY

## 2020-06-24 RX ORDER — QUETIAPINE 200 MG/1
400 TABLET, FILM COATED, EXTENDED RELEASE ORAL NIGHTLY
Status: DISCONTINUED | OUTPATIENT
Start: 2020-06-24 | End: 2020-06-24 | Stop reason: HOSPADM

## 2020-06-24 RX ORDER — QUETIAPINE 400 MG/1
400 TABLET, FILM COATED, EXTENDED RELEASE ORAL NIGHTLY
Qty: 30 TABLET | Refills: 0 | Status: SHIPPED | OUTPATIENT
Start: 2020-06-24

## 2020-06-24 RX ORDER — LEVOTHYROXINE SODIUM 0.05 MG/1
50 TABLET ORAL DAILY
Qty: 30 TABLET | Refills: 0 | Status: SHIPPED | OUTPATIENT
Start: 2020-06-25

## 2020-06-24 RX ADMIN — DIAZEPAM 5 MG: 5 TABLET ORAL at 12:05

## 2020-06-24 RX ADMIN — LITHIUM CARBONATE 300 MG: 300 TABLET, FILM COATED, EXTENDED RELEASE ORAL at 07:31

## 2020-06-24 RX ADMIN — CYCLOBENZAPRINE HYDROCHLORIDE 10 MG: 10 TABLET, FILM COATED ORAL at 12:05

## 2020-06-24 RX ADMIN — LEVOTHYROXINE SODIUM 50 MCG: 50 TABLET ORAL at 05:46

## 2020-06-24 RX ADMIN — NAPROXEN 500 MG: 500 TABLET ORAL at 07:31

## 2020-06-24 ASSESSMENT — PAIN DESCRIPTION - FREQUENCY: FREQUENCY: CONTINUOUS

## 2020-06-24 ASSESSMENT — PAIN DESCRIPTION - PROGRESSION
CLINICAL_PROGRESSION: NOT CHANGED
CLINICAL_PROGRESSION: NOT CHANGED

## 2020-06-24 ASSESSMENT — PAIN DESCRIPTION - PAIN TYPE: TYPE: CHRONIC PAIN

## 2020-06-24 ASSESSMENT — PAIN DESCRIPTION - LOCATION: LOCATION: BACK

## 2020-06-24 ASSESSMENT — PAIN SCALES - GENERAL
PAINLEVEL_OUTOF10: 9
PAINLEVEL_OUTOF10: 9

## 2020-06-24 ASSESSMENT — PAIN DESCRIPTION - DESCRIPTORS: DESCRIPTORS: ACHING

## 2020-06-24 ASSESSMENT — PAIN DESCRIPTION - ORIENTATION: ORIENTATION: LOWER

## 2020-06-24 ASSESSMENT — PAIN - FUNCTIONAL ASSESSMENT: PAIN_FUNCTIONAL_ASSESSMENT: PREVENTS OR INTERFERES SOME ACTIVE ACTIVITIES AND ADLS

## 2020-06-24 ASSESSMENT — PAIN DESCRIPTION - ONSET: ONSET: ON-GOING

## 2020-06-24 NOTE — PLAN OF CARE
Problem: Anger Management/Homicidal Ideation:  Goal: Absence of angry outbursts  Description: Absence of angry outbursts  Outcome: Ongoing     Problem: Pain:  Goal: Control of chronic pain  Description: Control of chronic pain  Outcome: Ongoing     Patient A & Ox 3 and does not seem to insight to the situation she is getting ready to face by going to alf. She made comments about looking forward to going to alf so she can tell them that she is going to adán them. She has been withdrawn to her bed and room for most of the shift. She denies SI/HI/A/V/H. She did not make any delusional statements while interacting with staff. She was cooperative with taking medications. She did not have as many request for pain medication today as yesterday. Patient absence of angry outbursts at present.

## 2020-06-24 NOTE — PROGRESS NOTES
[facility]  Discharge Note    Pt discharged with followings belongings:       Valuables sent with patient to the half-way. Patient education on aftercare instructions: yes  Patient verbalize understanding of AVS:  yes.     Status EXAM upon discharge:  Status and Exam  Normal: No  Facial Expression: Flat, Worried  Affect: Congruent  Level of Consciousness: Alert  Mood:Normal: No  Mood: Depressed, Anxious  Motor Activity:Normal: No  Motor Activity: Decreased  Interview Behavior: Cooperative  Preception: Krum to Person, Deannie Corbett to Time, Krum to Place, Krum to Situation  Attention:Normal: No  Attention: Distractible  Thought Processes: Other(See comment)(future oriented)  Thought Content:Normal: Yes  Thought Content: Preoccupations  Hallucinations: None  Delusions: No  Delusions: Obsessions  Memory:Normal: No  Memory: Poor Recent  Insight and Judgment: No  Insight and Judgment: Poor Insight, Poor Judgment  Present Suicidal Ideation: No  Present Homicidal Ideation: No      Metabolic Screening:    Lab Results   Component Value Date    LABA1C 5.3 06/18/2020       Lab Results   Component Value Date    CHOL 205 (H) 06/18/2020     Lab Results   Component Value Date    TRIG 86 06/18/2020     Lab Results   Component Value Date    HDL 53 06/18/2020     No components found for: [facility]  Lab Results   Component Value Date    LABVLDL 17 06/18/2020       Edna Hilario RN

## 2020-06-24 NOTE — PROGRESS NOTES
Staff called and gave report to Kennedy Sy at the [facility] [place] [phone] ext [phone].
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
## 2023-07-05 ENCOUNTER — OFFICE VISIT (OUTPATIENT)
Dept: URGENT CARE | Age: 58
End: 2023-07-05

## 2023-07-05 VITALS
BODY MASS INDEX: 28.17 KG/M2 | HEIGHT: 64 IN | RESPIRATION RATE: 12 BRPM | DIASTOLIC BLOOD PRESSURE: 80 MMHG | HEART RATE: 88 BPM | TEMPERATURE: 98.1 F | WEIGHT: 165 LBS | OXYGEN SATURATION: 95 % | SYSTOLIC BLOOD PRESSURE: 132 MMHG

## 2023-07-05 DIAGNOSIS — R03.0 ELEVATED BLOOD PRESSURE READING: ICD-10-CM

## 2023-07-05 DIAGNOSIS — M25.531 ACUTE PAIN OF RIGHT WRIST: ICD-10-CM
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
## 2023-07-05 DIAGNOSIS — S63.501A SPRAIN OF RIGHT WRIST, INITIAL ENCOUNTER: Primary | ICD-10-CM

## 2023-07-05 ASSESSMENT — ENCOUNTER SYMPTOMS
GASTROINTESTINAL NEGATIVE: 1
RESPIRATORY NEGATIVE: 1

## 2023-10-11 NOTE — ED NOTES
Level of Care Disposition: Discharge      Patient was seen by ED provider and [facility] AN AFFILIATE OF [facility] staff. The case was presented to psychiatric provider on-call, TYLER Mosley. Based on the ED evaluation and information presented to the provider by this nurse, the decision was made to discharge patient with the following referrals: [facility], additional referrals for outpatient psychiatrists and mental health services. RATIONALE FOR NON-ADMISSION:  The patient does not meet criteria for an involuntary psychiatric admission because she is not presenting an imminent risk to self or others. Denies suicidal/homicidal ideation, plan, or intent. Future oriented with plans to get connected with new PCP (today at 1pm) and look for new psychiatrist. Protective factors include her animals. \" They are like my babies. I take care of them. I love them\".       Ashley Thompson RN  06/20/19 9007 Rinvoq Counseling: I discussed with the patient the risks of Rinvoq therapy including but not limited to upper respiratory tract infections, shingles, cold sores, bronchitis, nausea, cough, fever, acne, and headache. Live vaccines should be avoided.  This medication has been linked to serious infections; higher rate of mortality; malignancy and lymphoproliferative disorders; major adverse cardiovascular events; thrombosis; thrombocytopenia, anemia, and neutropenia; lipid elevations; liver enzyme elevations; and gastrointestinal perforations.
